# Patient Record
Sex: FEMALE | Race: WHITE | NOT HISPANIC OR LATINO | Employment: OTHER | URBAN - METROPOLITAN AREA
[De-identification: names, ages, dates, MRNs, and addresses within clinical notes are randomized per-mention and may not be internally consistent; named-entity substitution may affect disease eponyms.]

---

## 2017-02-09 ENCOUNTER — OFFICE VISIT (OUTPATIENT)
Dept: DERMATOLOGY | Facility: CLINIC | Age: 67
End: 2017-02-09
Payer: MEDICARE

## 2017-02-09 VITALS — WEIGHT: 187 LBS | HEIGHT: 66 IN | BODY MASS INDEX: 30.05 KG/M2

## 2017-02-09 DIAGNOSIS — L81.4 SOLAR LENTIGO: ICD-10-CM

## 2017-02-09 DIAGNOSIS — D69.2 SENILE PURPURA: ICD-10-CM

## 2017-02-09 DIAGNOSIS — D22.9 MULTIPLE BENIGN NEVI: ICD-10-CM

## 2017-02-09 DIAGNOSIS — L82.1 SEBORRHEIC KERATOSES: Primary | ICD-10-CM

## 2017-02-09 DIAGNOSIS — D18.01 CHERRY ANGIOMA: ICD-10-CM

## 2017-02-09 PROCEDURE — 99203 OFFICE O/P NEW LOW 30 MIN: CPT | Mod: S$GLB,,, | Performed by: DERMATOLOGY

## 2017-02-09 PROCEDURE — 99999 PR PBB SHADOW E&M-EST. PATIENT-LVL II: CPT | Mod: PBBFAC,,, | Performed by: DERMATOLOGY

## 2017-02-09 RX ORDER — CLOTRIMAZOLE AND BETAMETHASONE DIPROPIONATE 10; .64 MG/G; MG/G
CREAM TOPICAL
Refills: 3 | COMMUNITY
Start: 2016-11-08 | End: 2018-12-17 | Stop reason: ALTCHOICE

## 2017-02-09 RX ORDER — MULTIVITAMIN
1 TABLET ORAL DAILY
COMMUNITY

## 2017-02-09 NOTE — PROGRESS NOTES
"  Subjective:       Patient ID:  Lorraine Moreno is a 66 y.o. female who presents for   Chief Complaint   Patient presents with    Skin Check     TBSE    Spot     Left arm    Dry Skin     Lower abdomen     HPI Comments: Initial visit  No h/o skin cancer  Requests TBSE for cancer screening    Reports intolerance to sunscreens, "make me breakout"  H/o bx post neck, "a long time ago" benign per patient, unknown provider        Spot  - Initial  Affected locations: left arm  Duration: 5 years  Signs / symptoms: asymptomatic  Severity: mild  Timing: constant  Aggravated by: nothing  Relieving factors/Treatments tried: nothing    Dry Skin  - Initial  Affected locations: Lower abdomen.  Duration: 4 years  Signs / symptoms: itching and dryness  Severity: mild  Timing: intermittent  Aggravated by: nothing  Relieving factors/Treatments tried: OTC moisturizer  Improvement on treatment: mild        Review of Systems   Constitutional: Negative for fever, chills, weight loss and night sweats.   Skin: Positive for dry skin and wears hat. Negative for daily sunscreen use and activity-related sunscreen use.   Hematologic/Lymphatic: Bruises/bleeds easily.        Objective:    Physical Exam   Constitutional: She appears well-developed and well-nourished. No distress.   Neurological: She is alert and oriented to person, place, and time. She is not disoriented.   Psychiatric: She has a normal mood and affect.   Skin:   Areas Examined (abnormalities noted in diagram):   Scalp / Hair Palpated and Inspected  Head / Face Inspection Performed  Neck Inspection Performed  Chest / Axilla Inspection Performed  Abdomen Inspection Performed  Genitals / Buttocks / Groin Inspection Performed  Back Inspection Performed  RUE Inspected  LUE Inspection Performed  RLE Inspected  LLE Inspection Performed  Nails and Digits Inspection Performed                   Diagram Legend     Erythematous scaling macule/papule c/w actinic keratosis       Vascular papule " c/w angioma      Pigmented verrucoid papule/plaque c/w seborrheic keratosis      Yellow umbilicated papule c/w sebaceous hyperplasia      Irregularly shaped tan macule c/w lentigo     1-2 mm smooth white papules consistent with Milia      Movable subcutaneous cyst with punctum c/w epidermal inclusion cyst      Subcutaneous movable cyst c/w pilar cyst      Firm pink to brown papule c/w dermatofibroma      Pedunculated fleshy papule(s) c/w skin tag(s)      Evenly pigmented macule c/w junctional nevus     Mildly variegated pigmented, slightly irregular-bordered macule c/w mildly atypical nevus      Flesh colored to evenly pigmented papule c/w intradermal nevus       Pink pearly papule/plaque c/w basal cell carcinoma      Erythematous hyperkeratotic cursted plaque c/w SCC      Surgical scar with no sign of skin cancer recurrence      Open and closed comedones      Inflammatory papules and pustules      Verrucoid papule consistent consistent with wart     Erythematous eczematous patches and plaques     Dystrophic onycholytic nail with subungual debris c/w onychomycosis     Umbilicated papule    Erythematous-base heme-crusted tan verrucoid plaque consistent with inflamed seborrheic keratosis     Erythematous Silvery Scaling Plaque c/w Psoriasis     See annotation      Assessment / Plan:        Seborrheic keratoses  These are benign inherited growths without a malignant potential. Reassurance given to patient. No treatment is necessary.     Cherry angioma  This is a benign vascular lesion. Reassurance given. No treatment required.     Solar lentigo  This is a benign hyperpigmented sun induced lesion. Daily sun protection will reduce the number of new lesions. Treatment of these benign lesions are considered cosmetic.    Senile purpura  Common finding in aging sun damaged skin; ASA contributes    Multiple benign nevi  Discussed ABCDE's of nevi.  Monitor for new mole or moles that are becoming bigger, darker, irritated, or  developing irregular borders.     Patient instructed in importance in daily sun protection of at least spf 30. Sun avoidance and topical protection discussed.   Recommend Elta MD (physician office) COTZ sensitive (available online) for daily use on face and neck.  Patient encouraged to wear hat for all outdoor exposure.   Also discussed sun protective clothing.             Return in about 1 year (around 2/9/2018).

## 2017-07-17 ENCOUNTER — DOCUMENTATION ONLY (OUTPATIENT)
Dept: FAMILY MEDICINE | Facility: CLINIC | Age: 67
End: 2017-07-17

## 2017-07-17 DIAGNOSIS — Z11.59 NEED FOR HEPATITIS C SCREENING TEST: Primary | ICD-10-CM

## 2017-07-17 NOTE — PATIENT INSTRUCTIONS
Weight Management: Getting Started  Healthy bodies come in all shapes and sizes. Not all bodies are made to be thin. For some people, a healthy weight is higher than the average weight listed on weight charts. Your healthcare provider can help you decide on a healthy weight for you.    Reasons to lose weight  Losing weight can help with some health problems, such as high blood pressure, heart disease, diabetes, sleep apnea, and arthritis. You may also feel more energy.  Set your long-term goal  Your goal doesn't even have to be a specific weight. You may decide on a fitness goal (such as being able to walk 10 miles a week), or a health goal (such as lowering your blood pressure). Choose a goal that is measurable and reasonable, so you know when you've reached it. A goal of reaching a BMI of less than 25 is not always reasonable (or possible).   Make an action plan  Habits dont change overnight. Setting your goals too high can leave you feeling discouraged if you cant reach them. Be realistic. Choose one or two small changes you can make now. Set an action plan for how you are going to make these changes. When you can stick to this plan, keep making a few more small changes. Taking small steps will help you stay on the path to success.  Track your progress  Write down your goals. Then, keep a daily record of your progress. Write down what you eat and how active you are. This record lets you look back on how much youve done. It may also help when youre feeling frustrated. Reward yourself for success. Even if you dont reach every goal, give yourself credit for what you do get done.  Get support  Encouragement from others can help make losing weight easier. Ask your family members and friends for support. They may even want to join you. Also look to your healthcare provider, registered dietitian, and  for help. Your local hospital can give you more information about nutrition, exercise, and  weight loss.  Date Last Reviewed: 1/31/2016 © 2000-2016 [employer]. [address] [place] [address], [place] [address]. All rights reserved. This information is not intended as a substitute for professional medical care. Always follow your healthcare professional's instructions.        Walking for Fitness  Fitness walking has something for everyone, even people who are already fit. Walking is one of the safest ways to condition your body aerobically. It can boost energy, help you lose weight, and reduce stress.    Physical benefits  · Walking strengthens your heart and lungs, and tones your muscles.  · When walking, your feet land with less impact than in other sports. This reduces chances of muscle, bone, and joint injury.  · Regular walking improves your cholesterol levels and lowers your risk of heart disease. And it helps you control your blood sugar if you have diabetes.  · Walking is a weight-bearing activity, which helps maintain bone density. This can help prevent osteoporosis.  Personal rewards  · Taking walks can help you relax and manage stress. And fitness walking may make you feel better about yourself.  · Walking can help you sleep better at night and make you less likely to be depressed.  · Regular walking may help maintain your memory as you get older.  · Walking is a great way to spend extra time with friends and family members. Be sure to invite your dog along!  Q&A about fitness walking  Q: Will walking keep me fit?  A: Yes. Regular walking at the right pace gives you all the benefits of other aerobic activities, such as jogging and swimming.  Q: Will walking help me lose weight and keep it off?  A: Yes. Per mile, walking can burn as many calories as jogging. Your health care provider can help work walking into your weight-loss plan.  Q: Is walking safe for my health?  A: Yes. Walking is safe if you have high blood pressure, diabetes, heart disease, or other conditions. Talk to your health  care provider before you start.  Date Last Reviewed: 5/9/2015  © 5720-3547 [employer]. [address] [place] [address], [place] [address]. All rights reserved. This information is not intended as a substitute for professional medical care. Always follow your healthcare professional's instructions.

## 2017-07-17 NOTE — PROGRESS NOTES
Pre-Visit Chart Review  For Appointment Scheduled on 07/20/2017    Health Maintenance Due   Topic Date Due    Hepatitis C Screening  1950    Lipid Panel  1950    TETANUS VACCINE  02/25/1969    Colonoscopy  02/25/2001    Zoster Vaccine  02/25/2011    Pneumococcal (65+) (1 of 2 - PCV13) 02/25/2016    DEXA SCAN  07/01/2016

## 2017-07-20 ENCOUNTER — OFFICE VISIT (OUTPATIENT)
Dept: FAMILY MEDICINE | Facility: CLINIC | Age: 67
End: 2017-07-20
Payer: MEDICARE

## 2017-07-20 VITALS
WEIGHT: 187.38 LBS | DIASTOLIC BLOOD PRESSURE: 59 MMHG | BODY MASS INDEX: 30.11 KG/M2 | HEIGHT: 66 IN | TEMPERATURE: 99 F | SYSTOLIC BLOOD PRESSURE: 98 MMHG | HEART RATE: 70 BPM

## 2017-07-20 DIAGNOSIS — Z86.010 HISTORY OF COLON POLYPS: ICD-10-CM

## 2017-07-20 DIAGNOSIS — M19.90 OSTEOARTHRITIS, UNSPECIFIED OSTEOARTHRITIS TYPE, UNSPECIFIED SITE: ICD-10-CM

## 2017-07-20 DIAGNOSIS — Z00.00 ENCOUNTER FOR PREVENTIVE HEALTH EXAMINATION: Primary | ICD-10-CM

## 2017-07-20 DIAGNOSIS — E66.9 OBESITY (BMI 30.0-34.9): ICD-10-CM

## 2017-07-20 DIAGNOSIS — E78.5 HYPERLIPIDEMIA, UNSPECIFIED HYPERLIPIDEMIA TYPE: ICD-10-CM

## 2017-07-20 DIAGNOSIS — N32.81 OVERACTIVE BLADDER: ICD-10-CM

## 2017-07-20 DIAGNOSIS — I10 ESSENTIAL HYPERTENSION: ICD-10-CM

## 2017-07-20 PROBLEM — Z86.0100 HISTORY OF COLON POLYPS: Status: ACTIVE | Noted: 2017-07-20

## 2017-07-20 PROBLEM — E66.811 OBESITY (BMI 30.0-34.9): Status: ACTIVE | Noted: 2017-07-20

## 2017-07-20 PROCEDURE — 99999 PR PBB SHADOW E&M-EST. PATIENT-LVL IV: CPT | Mod: PBBFAC,,, | Performed by: PHYSICIAN ASSISTANT

## 2017-07-20 PROCEDURE — G0439 PPPS, SUBSEQ VISIT: HCPCS | Mod: S$GLB,,, | Performed by: PHYSICIAN ASSISTANT

## 2017-07-20 RX ORDER — FERROUS SULFATE 325(65) MG
325 TABLET ORAL NIGHTLY PRN
COMMUNITY
End: 2021-05-14

## 2017-07-20 RX ORDER — FLAXSEED OIL 1000 MG
1000 CAPSULE ORAL DAILY
COMMUNITY
End: 2017-10-05

## 2017-07-20 NOTE — PATIENT INSTRUCTIONS
Counseling and Referral of Other Preventative  (Italic type indicates deductible and co-insurance are waived)    Patient Name: Lorraine Moreno  Today's Date: 7/20/2017      SERVICE LIMITATIONS RECOMMENDATION    Vaccines    · Pneumococcal (once after 65)    · Influenza (annually)    · Hepatitis B (if medium/high risk)    · Prevnar 13      Hepatitis B medium/high risk factors:       - End-stage renal disease       - Hemophiliacs who received Factor VII or         IX concentrates       - Clients of institutions for the mentally             retarded       - Persons who live in the same house as          a HepB carrier       - Homosexual men       - Illicit injectable drug abusers     Pneumococcal: Recommend f/u with PCP     Influenza: Done, repeat in one year     Hepatitis B: N/A     Prevnar 13: Recommend f/u with PCP    Mammogram (biennial age 50-74)  Annually (age 40 or over)  Done this year, repeat every year    Pap (up to age 70 and after 70 if unknown history or abnormal study last 10 years)   Per GYN rec every other year         Colorectal cancer screening (to age 75)    · Fecal occult blood test (annual)  · Flexible sigmoidoscopy (5y)  · Screening colonoscopy (10y)  · Barium enema   Last done 2014, recommend to repeat every 10  years    Diabetes self-management training (no USPSTF recommendations)  Requires referral by treating physician for patient with diabetes or renal disease. 10 hours of initial DSMT sessions of no less than 30 minutes each in a continuous 12-month period. 2 hours of follow-up DSMT in subsequent years.  N/A    Bone mass measurements (age 65 & older, biennial)  Requires diagnosis related to osteoporosis or estrogen deficiency. Biennial benefit unless patient has history of long-term glucocorticoid  Last done 2013, recommend to repeat every 5  years    Glaucoma screening (no USPSTF recommendation)  Diabetes mellitus, family history   , age 50 or over    American, age 65 or  over Annually    Medical nutrition therapy for diabetes or renal disease (no recommended schedule)  Requires referral by treating physician for patient with diabetes or renal disease or kidney transplant within the past 3 years.  Can be provided in same year as diabetes self-management training (DSMT), and CMS recommends medical nutrition therapy take place after DSMT. Up to 3 hours for initial year and 2 hours in subsequent years. N/A    Cardiovascular screening blood tests (every 5 years)  · Fasting lipid panel  Order as a panel if possible  Done this year, repeat every year    Diabetes screening tests (at least every 3 years, Medicare covers annually or at 6-month intervals for prediabetic patients)  · Fasting blood sugar (FBS) or glucose tolerance test (GTT)  Patient must be diagnosed with one of the following:       - Hypertension       - Dyslipidemia       - Obesity (BMI 30kg/m2)       - Previous elevated impaired FBS or GTT       ... or any two of the following:       - Overweight (BMI 25 but <30)       - Family history of diabetes       - Age 65 or older       - History of gestational diabetes or birth of baby weighing more than 9 pounds  Done this year, repeat every year    Abdominal aortic aneurysm screening (once)  · Sonogram   Limited to patients who meet one of the following criteria:       - Men who are 65-75 years old and have smoked more than 100 cigarette in their lifetime       - Anyone with a family history of abdominal aortic aneurysm       - Anyone recommended for screening by the USPSTF  N/A    HIV screening (annually for increased risk patients)  · HIV-1 and HIV-2 by EIA, or JIMI, rapid antibody test or oral mucosa transudate  Patients must be at increased risk for HIV infection per USPSTF guidelines or pregnant. Tests covered annually for patient at increased risk or as requested by the patient. Pregnant patients may receive up to 3 tests during pregnancy.  Risks discussed, screening is not  recommended    Smoking cessation counseling (up to 8 sessions per year)  Patients must be asymptomatic of tobacco-related conditions to receive as a preventative service.  Non-smoker    Subsequent annual wellness visit  At least 12 months since last AWV  Return in one year     The following information is provided to all patients.  This information is to help you find resources for any of the problems found today that may be affecting your health:                Living healthy guide: [email or web address].[email or web address].[facility]      Understanding Diabetes: www.diabetes.org      Eating healthy: www.cdc.gov/healthyweight      [facility] home safety checklist: www.cdc.gov/steadi/patient.html      Agency on Aging: www.goea.[place].[facility]      Alcoholics anonymous (AA): www.aa.org      Physical Activity: www.benjamin.nih.gov/un7atcb      Tobacco use: www.quitwithusla.org

## 2017-07-20 NOTE — PROGRESS NOTES
Subjective:       Patient ID: Lorraine Moreno is here for   Chief Complaint   Patient presents with    Health Risk Assessment       Lorraine Moreno was seen today in a face to face encounter for a comprehensive Health Risk Assessment. This visit included a review of her total medical record available at the time of this visit.        Past Medical, Family, and Surgical History:      This information was reviewed and reconciled today during this encounter. Medications were reviewed and reconciled today. The active problem list has been reviewed/updated and reconciled today. These active problems are listed in the diagnosis list below.    **See completed HRA forms/Questionnaires/Flowsheets for ROS information.    Physical Exam   Constitutional: She is oriented to person, place, and time. She appears well-developed.   Obese body habitus.   HENT:   Head: Normocephalic and atraumatic.   Right Ear: Hearing and external ear normal.   Left Ear: Hearing and external ear normal.   Eyes: Conjunctivae and EOM are normal. Pupils are equal, round, and reactive to light.   Cardiovascular: Normal rate, regular rhythm, normal heart sounds and intact distal pulses.    Pulmonary/Chest: Effort normal and breath sounds normal.   Neurological: She is alert and oriented to person, place, and time.   Skin: Skin is warm and dry.   Psychiatric: She has a normal mood and affect. Her behavior is normal.   Vitals reviewed.        Diagnoses (identified today) and assoicated plan for each diagnosis:         Encounter for preventive health examination    Essential hypertension  Comments:  Controlled, continue tenormin and norvasc as prescribed by PCP    Hyperlipidemia, unspecified hyperlipidemia type  Comments:  Stable, on pravastatin 20 mg daily, continue to follow with PCP    Osteoarthritis, unspecified osteoarthritis type, unspecified site  Comments:  Chronic, on norco 10/325 mg PRN per PCP    Overactive bladder  Comments:  Stable, on oxybutynin per  PCP    History of colon polyps  Comments:  Per pt, last colonoscopy in 2014 recommended 10 year f/u with Dr. Aly    Obesity (BMI 30.0-34.9)  Comments:  Uncontrolled, encouraged weight loss and increasing physical activity    With regards to health maintenance, pt states she is up to date with lab testing such as lipid panel. Recommended pt obtain zoster vaccine and tetanus vaccine at local pharmacy approved by her insurance. Pt recommended to f/u with PCP for pneumo 23 and prevnar 13 if not already completed. Recommended 1 one screening for Hep C with PCP.     Patient readiness: acceptance and barriers:none    During the course of the visit the patient was educated and counseled about the following:     Obesity:   Regular aerobic exercise program discussed.    Goals: Obesity: Reduce calorie intake and BMI    Did patient meet goals/outcomes: Yes    The following self management tools provided: declined    Patient Instructions (the written plan) was given to the patient/family.     Time spent with patient: 30 minutes

## 2017-10-05 ENCOUNTER — OFFICE VISIT (OUTPATIENT)
Dept: DERMATOLOGY | Facility: CLINIC | Age: 67
End: 2017-10-05
Payer: MEDICARE

## 2017-10-05 VITALS — HEIGHT: 66 IN | BODY MASS INDEX: 30.05 KG/M2 | WEIGHT: 187 LBS

## 2017-10-05 DIAGNOSIS — L82.0 INFLAMED SEBORRHEIC KERATOSIS: ICD-10-CM

## 2017-10-05 DIAGNOSIS — L71.0 PERIORAL DERMATITIS: Primary | ICD-10-CM

## 2017-10-05 PROCEDURE — 99999 PR PBB SHADOW E&M-EST. PATIENT-LVL II: CPT | Mod: PBBFAC,,, | Performed by: DERMATOLOGY

## 2017-10-05 PROCEDURE — 17110 DESTRUCTION B9 LES UP TO 14: CPT | Mod: S$GLB,,, | Performed by: DERMATOLOGY

## 2017-10-05 PROCEDURE — 99213 OFFICE O/P EST LOW 20 MIN: CPT | Mod: 25,S$GLB,, | Performed by: DERMATOLOGY

## 2017-10-05 RX ORDER — DOXYCYCLINE 100 MG/1
CAPSULE ORAL
Qty: 30 CAPSULE | Refills: 0 | Status: SHIPPED | OUTPATIENT
Start: 2017-10-05 | End: 2018-11-29 | Stop reason: CLARIF

## 2017-10-05 NOTE — PROGRESS NOTES
"  Subjective:       Patient ID:  Lorraine Moreno is a 67 y.o. female who presents for   Chief Complaint   Patient presents with    Spot     face, scalp     Patient last seen 2/2017  No h/o skin cancer  Stye in July,   C/o "blister like bumps" on R cheek, started at the time of the stye, now "breaking out all the time", mostly R cheek and nose  continuously new ones  washes face with olay  Cleanser x long time  elta MD daily moisturizer with sunscreen (only uses at the beach)  olay moisturizer night cream, no day cream  Not itchy          Spot  - Initial  Affected locations: face and scalp  Duration: 8 months  Signs / symptoms: dryness and scaling  Severity: mild  Timing: constant  Aggravated by: nothing  Treatments tried: Oil of Olay night cream.  Improvement on treatment: no relief        Review of Systems   Constitutional: Negative for fever, chills, weight loss, weight gain, fatigue, night sweats and malaise.   Skin: Positive for dry skin (face), activity-related sunscreen use and wears hat. Negative for daily sunscreen use.   Hematologic/Lymphatic: Bruises/bleeds easily.        Objective:    Physical Exam   Skin:   Areas Examined (abnormalities noted in diagram):   Scalp / Hair Palpated and Inspected  Head / Face Inspection Performed              Diagram Legend     Erythematous scaling macule/papule c/w actinic keratosis       Vascular papule c/w angioma      Pigmented verrucoid papule/plaque c/w seborrheic keratosis      Yellow umbilicated papule c/w sebaceous hyperplasia      Irregularly shaped tan macule c/w lentigo     1-2 mm smooth white papules consistent with Milia      Movable subcutaneous cyst with punctum c/w epidermal inclusion cyst      Subcutaneous movable cyst c/w pilar cyst      Firm pink to brown papule c/w dermatofibroma      Pedunculated fleshy papule(s) c/w skin tag(s)      Evenly pigmented macule c/w junctional nevus     Mildly variegated pigmented, slightly irregular-bordered macule c/w mildly " atypical nevus      Flesh colored to evenly pigmented papule c/w intradermal nevus       Pink pearly papule/plaque c/w basal cell carcinoma      Erythematous hyperkeratotic cursted plaque c/w SCC      Surgical scar with no sign of skin cancer recurrence      Open and closed comedones      Inflammatory papules and pustules      Verrucoid papule consistent consistent with wart     Erythematous eczematous patches and plaques     Dystrophic onycholytic nail with subungual debris c/w onychomycosis     Umbilicated papule    Erythematous-base heme-crusted tan verrucoid plaque consistent with inflamed seborrheic keratosis     Erythematous Silvery Scaling Plaque c/w Psoriasis     See annotation      Assessment / Plan:        Perioral dermatitis  -     doxycycline (VIBRAMYCIN) 100 MG Cap; One tab PO daily with food and tall glass of water  Dispense: 30 capsule; Refill: 0  No blistering component appreciated today  Distribution not consistent with herpes or VZV    Inflamed seborrheic keratosis, itchy, bothersome, inflamed by comb  Cryosurgery procedure note:    Verbal consent from the patient is obtained. Liquid nitrogen cryosurgery is applied to 1 lesions to produce a freeze injury. The patient is aware that blisters may form and is instructed on wound care with gentle cleansing and use of vaseline ointment to keep moist until healed. The patient is supplied a handout on cryosurgery and is instructed to call if lesions do not completely resolve.             Return if symptoms worsen or fail to improve.

## 2018-04-18 DIAGNOSIS — Z12.31 VISIT FOR SCREENING MAMMOGRAM: Primary | ICD-10-CM

## 2018-04-26 ENCOUNTER — HOSPITAL ENCOUNTER (OUTPATIENT)
Dept: RADIOLOGY | Facility: HOSPITAL | Age: 68
Discharge: HOME | End: 2018-04-26
Attending: SPECIALIST
Payer: MEDICARE

## 2018-04-26 DIAGNOSIS — Z12.31 VISIT FOR SCREENING MAMMOGRAM: ICD-10-CM

## 2018-04-26 PROCEDURE — 77067 SCR MAMMO BI INCL CAD: CPT | Mod: 26,,, | Performed by: RADIOLOGY

## 2018-04-26 PROCEDURE — 77063 BREAST TOMOSYNTHESIS BI: CPT | Mod: 26,,, | Performed by: RADIOLOGY

## 2018-04-26 PROCEDURE — 77067 SCR MAMMO BI INCL CAD: CPT | Mod: TC

## 2018-12-12 PROBLEM — M17.11 PRIMARY OSTEOARTHRITIS OF RIGHT KNEE: Status: ACTIVE | Noted: 2018-12-12

## 2019-07-18 DIAGNOSIS — Z12.39 BREAST SCREENING: Primary | ICD-10-CM

## 2019-07-22 ENCOUNTER — HOSPITAL ENCOUNTER (OUTPATIENT)
Dept: RADIOLOGY | Facility: HOSPITAL | Age: 69
Discharge: HOME | End: 2019-07-22
Attending: SPECIALIST
Payer: MEDICARE

## 2019-07-22 DIAGNOSIS — Z12.39 BREAST SCREENING: ICD-10-CM

## 2019-07-22 PROCEDURE — 77063 MAMMO DIGITAL SCREENING BILAT WITH TOMOSYNTHESIS_CAD: ICD-10-PCS | Mod: 26,,, | Performed by: RADIOLOGY

## 2019-07-22 PROCEDURE — 77067 SCR MAMMO BI INCL CAD: CPT | Mod: TC

## 2019-07-22 PROCEDURE — 77063 BREAST TOMOSYNTHESIS BI: CPT | Mod: 26,,, | Performed by: RADIOLOGY

## 2019-07-22 PROCEDURE — 77067 SCR MAMMO BI INCL CAD: CPT | Mod: 26,,, | Performed by: RADIOLOGY

## 2019-07-22 PROCEDURE — 77067 MAMMO DIGITAL SCREENING BILAT WITH TOMOSYNTHESIS_CAD: ICD-10-PCS | Mod: 26,,, | Performed by: RADIOLOGY

## 2019-08-29 ENCOUNTER — OFFICE VISIT (OUTPATIENT)
Dept: DERMATOLOGY | Facility: CLINIC | Age: 69
End: 2019-08-29
Payer: MEDICARE

## 2019-08-29 VITALS — BODY MASS INDEX: 26.53 KG/M2 | WEIGHT: 169 LBS | HEIGHT: 67 IN

## 2019-08-29 DIAGNOSIS — Z12.83 SKIN CANCER SCREENING: ICD-10-CM

## 2019-08-29 DIAGNOSIS — L81.4 SOLAR LENTIGO: ICD-10-CM

## 2019-08-29 DIAGNOSIS — D22.9 MULTIPLE BENIGN NEVI: ICD-10-CM

## 2019-08-29 DIAGNOSIS — D18.01 CHERRY ANGIOMA: ICD-10-CM

## 2019-08-29 DIAGNOSIS — L82.1 SEBORRHEIC KERATOSES: Primary | ICD-10-CM

## 2019-08-29 PROCEDURE — 1101F PT FALLS ASSESS-DOCD LE1/YR: CPT | Mod: CPTII,S$GLB,, | Performed by: DERMATOLOGY

## 2019-08-29 PROCEDURE — 99999 PR PBB SHADOW E&M-EST. PATIENT-LVL II: CPT | Mod: PBBFAC,,, | Performed by: DERMATOLOGY

## 2019-08-29 PROCEDURE — 99213 OFFICE O/P EST LOW 20 MIN: CPT | Mod: S$GLB,,, | Performed by: DERMATOLOGY

## 2019-08-29 PROCEDURE — 99999 PR PBB SHADOW E&M-EST. PATIENT-LVL II: ICD-10-PCS | Mod: PBBFAC,,, | Performed by: DERMATOLOGY

## 2019-08-29 PROCEDURE — 1101F PR PT FALLS ASSESS DOC 0-1 FALLS W/OUT INJ PAST YR: ICD-10-PCS | Mod: CPTII,S$GLB,, | Performed by: DERMATOLOGY

## 2019-08-29 PROCEDURE — 99213 PR OFFICE/OUTPT VISIT, EST, LEVL III, 20-29 MIN: ICD-10-PCS | Mod: S$GLB,,, | Performed by: DERMATOLOGY

## 2019-08-29 NOTE — PROGRESS NOTES
Subjective:       Patient ID:  Lorraine Moreno is a 69 y.o. female who presents for   Chief Complaint   Patient presents with    Skin Check     TBSE    Spot     R forearm, X1 month, worsened by sun, no tx     Patient last seen 10/2017 with perioral derm, cleared with doxy  No h/o skin cancer  H/o AKs treated with cryo    elta MD daily moisturizer with sunscreen ([place] uses at the beach)  olay moisturizer night cream, daily moisturizer from  [facility]          Spot  - Initial  Affected locations: R forearm.  Duration: 1 month  Signs / symptoms: redness  Severity: mild  Timing: constant  Aggravated by: sunlight  Relieving factors/Treatments tried: nothing  Improvement on treatment: no relief        Review of Systems   Constitutional: Negative for fever, chills and fatigue.   Skin: Positive for daily sunscreen use, activity-related sunscreen use and wears hat.   Hematologic/Lymphatic: Bruises/bleeds easily.        Objective:    Physical Exam   Constitutional: She appears well-developed and well-nourished. No distress.   Neurological: She is alert and oriented to person, place, and time. She is not disoriented.   Psychiatric: She has a normal mood and affect.   Skin:   Areas Examined (abnormalities noted in diagram):   Scalp / Hair Palpated and Inspected  Head / Face Inspection Performed  Neck Inspection Performed  Chest / Axilla Inspection Performed  Abdomen Inspection Performed  Genitals / Buttocks / Groin Inspection Performed  Back Inspection Performed  RUE Inspected  LUE Inspection Performed  RLE Inspected  LLE Inspection Performed  Nails and Digits Inspection Performed                       Diagram Legend     Erythematous scaling macule/papule c/w actinic keratosis       Vascular papule c/w angioma      Pigmented verrucoid papule/plaque c/w seborrheic keratosis      Yellow umbilicated papule c/w sebaceous hyperplasia      Irregularly shaped tan macule c/w lentigo     1-2 mm smooth white papules consistent  with Milia      Movable subcutaneous cyst with punctum c/w epidermal inclusion cyst      Subcutaneous movable cyst c/w pilar cyst      Firm pink to brown papule c/w dermatofibroma      Pedunculated fleshy papule(s) c/w skin tag(s)      Evenly pigmented macule c/w junctional nevus     Mildly variegated pigmented, slightly irregular-bordered macule c/w mildly atypical nevus      Flesh colored to evenly pigmented papule c/w intradermal nevus       Pink pearly papule/plaque c/w basal cell carcinoma      Erythematous hyperkeratotic cursted plaque c/w SCC      Surgical scar with no sign of skin cancer recurrence      Open and closed comedones      Inflammatory papules and pustules      Verrucoid papule consistent consistent with wart     Erythematous eczematous patches and plaques     Dystrophic onycholytic nail with subungual debris c/w onychomycosis     Umbilicated papule    Erythematous-base heme-crusted tan verrucoid plaque consistent with inflamed seborrheic keratosis     Erythematous Silvery Scaling Plaque c/w Psoriasis     See annotation      Assessment / Plan:        Seborrheic keratoses  These are benign inherited growths without a malignant potential. Reassurance given to patient. No treatment is necessary.     Solar lentigo  This is a benign hyperpigmented sun induced lesion. Daily sun protection will reduce the number of new lesions. Treatment of these benign lesions are considered cosmetic.    Cherry angioma  This is a benign vascular lesion. Reassurance given. No treatment required.     Multiple benign nevi  Monitor for new mole or moles that are becoming bigger, darker, irritated, or developing irregular borders.    Skin cancer screening  Total body skin examination performed today including at least 12 points as noted in physical examination. No lesions suspicious for malignancy noted.    Patient instructed in importance in daily sun protection of at least spf 30. Mineral sunscreen ingredients preferred  (Zinc +/- Titanium).   Recommend Elta MD for daily use on face and neck.  Patient encouraged to wear hat for all outdoor exposure.   Also discussed sun avoidance and use of protective clothing.         Follow up in about 1 year (around 8/29/2020), or if symptoms worsen or fail to improve.

## 2020-08-26 ENCOUNTER — HOSPITAL ENCOUNTER (OUTPATIENT)
Dept: RADIOLOGY | Facility: CLINIC | Age: 70
Discharge: HOME | End: 2020-08-26
Attending: SPECIALIST
Payer: MEDICARE

## 2020-08-26 DIAGNOSIS — Z13.820 SPECIAL SCREENING FOR OSTEOPOROSIS: ICD-10-CM

## 2020-08-26 DIAGNOSIS — Z78.0 MENOPAUSE: ICD-10-CM

## 2020-08-26 DIAGNOSIS — Z12.31 VISIT FOR SCREENING MAMMOGRAM: ICD-10-CM

## 2020-08-26 PROCEDURE — 77067 SCR MAMMO BI INCL CAD: CPT | Mod: 26,,, | Performed by: RADIOLOGY

## 2020-08-26 PROCEDURE — 77080 DXA BONE DENSITY AXIAL: CPT | Mod: 26,,, | Performed by: RADIOLOGY

## 2020-08-26 PROCEDURE — 77080 DXA BONE DENSITY AXIAL: CPT | Mod: TC,PO

## 2020-08-26 PROCEDURE — 77067 MAMMO DIGITAL SCREENING BILAT WITH TOMOSYNTHESIS_CAD: ICD-10-PCS | Mod: 26,,, | Performed by: RADIOLOGY

## 2020-08-26 PROCEDURE — 77063 BREAST TOMOSYNTHESIS BI: CPT | Mod: 26,,, | Performed by: RADIOLOGY

## 2020-08-26 PROCEDURE — 77080 DEXA BONE DENSITY SPINE HIP: ICD-10-PCS | Mod: 26,,, | Performed by: RADIOLOGY

## 2020-08-26 PROCEDURE — 77067 SCR MAMMO BI INCL CAD: CPT | Mod: TC,PO

## 2020-08-26 PROCEDURE — 77063 MAMMO DIGITAL SCREENING BILAT WITH TOMOSYNTHESIS_CAD: ICD-10-PCS | Mod: 26,,, | Performed by: RADIOLOGY

## 2020-09-08 ENCOUNTER — HOSPITAL ENCOUNTER (OUTPATIENT)
Dept: RADIOLOGY | Facility: HOSPITAL | Age: 70
Discharge: HOME | End: 2020-09-08
Attending: SPECIALIST
Payer: MEDICARE

## 2020-09-08 DIAGNOSIS — N63.15 BREAST LUMP ON RIGHT SIDE AT 3 O'CLOCK POSITION: ICD-10-CM

## 2020-09-08 PROCEDURE — 77065 DX MAMMO INCL CAD UNI: CPT | Mod: 26,RT,, | Performed by: RADIOLOGY

## 2020-09-08 PROCEDURE — 77061 MAMMO DIGITAL DIAGNOSTIC RIGHT WITH TOMOSYNTHESIS_CAD: ICD-10-PCS | Mod: 26,RT,, | Performed by: RADIOLOGY

## 2020-09-08 PROCEDURE — 77065 DX MAMMO INCL CAD UNI: CPT | Mod: TC

## 2020-09-08 PROCEDURE — 77061 BREAST TOMOSYNTHESIS UNI: CPT | Mod: TC

## 2020-09-08 PROCEDURE — 77061 BREAST TOMOSYNTHESIS UNI: CPT | Mod: 26,RT,, | Performed by: RADIOLOGY

## 2020-09-08 PROCEDURE — 77065 MAMMO DIGITAL DIAGNOSTIC RIGHT WITH TOMOSYNTHESIS_CAD: ICD-10-PCS | Mod: 26,RT,, | Performed by: RADIOLOGY

## 2021-01-19 RX ORDER — AMLODIPINE BESYLATE 5 MG/1
5 TABLET ORAL DAILY
Qty: 90 TABLET | Refills: 3 | Status: SHIPPED | OUTPATIENT
Start: 2021-01-19 | End: 2021-01-19 | Stop reason: SDUPTHER

## 2021-01-19 RX ORDER — PRAVASTATIN SODIUM 20 MG/1
20 TABLET ORAL NIGHTLY
Qty: 90 TABLET | Refills: 3 | Status: SHIPPED | OUTPATIENT
Start: 2021-01-19 | End: 2021-04-15 | Stop reason: SDUPTHER

## 2021-01-19 RX ORDER — AMLODIPINE BESYLATE 5 MG/1
5 TABLET ORAL DAILY
Qty: 90 TABLET | Refills: 3 | Status: SHIPPED | OUTPATIENT
Start: 2021-01-19 | End: 2021-04-15 | Stop reason: SDUPTHER

## 2021-04-14 LAB
ALBUMIN SERPL-MCNC: 4 G/DL (ref 3.6–5.1)
ALBUMIN/GLOB SERPL: 1.7 (CALC) (ref 1–2.5)
ALP SERPL-CCNC: 67 U/L (ref 37–153)
ALT SERPL-CCNC: 8 U/L (ref 6–29)
APPEARANCE UR: CLEAR
AST SERPL-CCNC: 15 U/L (ref 10–35)
BILIRUB SERPL-MCNC: 0.6 MG/DL (ref 0.2–1.2)
BILIRUB UR QL STRIP: NEGATIVE
BUN SERPL-MCNC: 19 MG/DL (ref 7–25)
BUN/CREAT SERPL: NORMAL (CALC) (ref 6–22)
CALCIUM SERPL-MCNC: 9.3 MG/DL (ref 8.6–10.4)
CHLORIDE SERPL-SCNC: 108 MMOL/L (ref 98–110)
CHOLEST SERPL-MCNC: 195 MG/DL
CHOLEST/HDLC SERPL: 2.2 (CALC)
CO2 SERPL-SCNC: 28 MMOL/L (ref 20–32)
COLOR UR: YELLOW
CREAT SERPL-MCNC: 0.77 MG/DL (ref 0.6–0.93)
ERYTHROCYTE [DISTWIDTH] IN BLOOD BY AUTOMATED COUNT: 12.7 % (ref 11–15)
GLOBULIN SER CALC-MCNC: 2.3 G/DL (CALC) (ref 1.9–3.7)
GLUCOSE SERPL-MCNC: 92 MG/DL (ref 65–99)
GLUCOSE UR QL STRIP: NEGATIVE
HBA1C MFR BLD: 5 % OF TOTAL HGB
HCT VFR BLD AUTO: 36 % (ref 35–45)
HDLC SERPL-MCNC: 87 MG/DL
HGB BLD-MCNC: 11.7 G/DL (ref 11.7–15.5)
HGB UR QL STRIP: NEGATIVE
KETONES UR QL STRIP: NEGATIVE
LDLC SERPL CALC-MCNC: 91 MG/DL (CALC)
LEUKOCYTE ESTERASE UR QL STRIP: NEGATIVE
MAGNESIUM SERPL-MCNC: 2.1 MG/DL (ref 1.5–2.5)
MCH RBC QN AUTO: 29.8 PG (ref 27–33)
MCHC RBC AUTO-ENTMCNC: 32.5 G/DL (ref 32–36)
MCV RBC AUTO: 91.6 FL (ref 80–100)
NITRITE UR QL STRIP: NEGATIVE
NONHDLC SERPL-MCNC: 108 MG/DL (CALC)
PH UR STRIP: 7.5 [PH] (ref 5–8)
PLATELET # BLD AUTO: 269 THOUSAND/UL (ref 140–400)
PMV BLD REES-ECKER: 10.3 FL (ref 7.5–12.5)
POTASSIUM SERPL-SCNC: 4.4 MMOL/L (ref 3.5–5.3)
PROT SERPL-MCNC: 6.3 G/DL (ref 6.1–8.1)
PROT UR QL STRIP: NEGATIVE
RBC # BLD AUTO: 3.93 MILLION/UL (ref 3.8–5.1)
SODIUM SERPL-SCNC: 141 MMOL/L (ref 135–146)
SP GR UR STRIP: 1.01 (ref 1–1.03)
TRIGL SERPL-MCNC: 79 MG/DL
WBC # BLD AUTO: 3.8 THOUSAND/UL (ref 3.8–10.8)

## 2021-04-15 RX ORDER — PRAVASTATIN SODIUM 20 MG/1
20 TABLET ORAL NIGHTLY
Qty: 90 TABLET | Refills: 3 | Status: SHIPPED | OUTPATIENT
Start: 2021-04-15 | End: 2021-08-18 | Stop reason: SDUPTHER

## 2021-04-15 RX ORDER — AMLODIPINE BESYLATE 5 MG/1
5 TABLET ORAL DAILY
Qty: 90 TABLET | Refills: 3 | Status: SHIPPED | OUTPATIENT
Start: 2021-04-15 | End: 2021-08-18 | Stop reason: SDUPTHER

## 2021-05-14 ENCOUNTER — OFFICE VISIT (OUTPATIENT)
Dept: DERMATOLOGY | Facility: CLINIC | Age: 71
End: 2021-05-14
Payer: MEDICARE

## 2021-05-14 DIAGNOSIS — L81.4 SOLAR LENTIGO: ICD-10-CM

## 2021-05-14 DIAGNOSIS — D22.9 MULTIPLE BENIGN NEVI: ICD-10-CM

## 2021-05-14 DIAGNOSIS — L91.8 SKIN TAG: ICD-10-CM

## 2021-05-14 DIAGNOSIS — L82.1 SEBORRHEIC KERATOSES: Primary | ICD-10-CM

## 2021-05-14 DIAGNOSIS — D18.01 CHERRY ANGIOMA: ICD-10-CM

## 2021-05-14 PROCEDURE — 99999 PR PBB SHADOW E&M-EST. PATIENT-LVL III: CPT | Mod: PBBFAC,,, | Performed by: DERMATOLOGY

## 2021-05-14 PROCEDURE — 1101F PR PT FALLS ASSESS DOC 0-1 FALLS W/OUT INJ PAST YR: ICD-10-PCS | Mod: S$GLB,,, | Performed by: DERMATOLOGY

## 2021-05-14 PROCEDURE — 99999 PR PBB SHADOW E&M-EST. PATIENT-LVL III: ICD-10-PCS | Mod: PBBFAC,,, | Performed by: DERMATOLOGY

## 2021-05-14 PROCEDURE — 1159F MED LIST DOCD IN RCRD: CPT | Mod: S$GLB,,, | Performed by: DERMATOLOGY

## 2021-05-14 PROCEDURE — 99213 OFFICE O/P EST LOW 20 MIN: CPT | Mod: S$GLB,,, | Performed by: DERMATOLOGY

## 2021-05-14 PROCEDURE — 1126F AMNT PAIN NOTED NONE PRSNT: CPT | Mod: S$GLB,,, | Performed by: DERMATOLOGY

## 2021-05-14 PROCEDURE — 3288F FALL RISK ASSESSMENT DOCD: CPT | Mod: S$GLB,,, | Performed by: DERMATOLOGY

## 2021-05-14 PROCEDURE — 1101F PT FALLS ASSESS-DOCD LE1/YR: CPT | Mod: S$GLB,,, | Performed by: DERMATOLOGY

## 2021-05-14 PROCEDURE — 1159F PR MEDICATION LIST DOCUMENTED IN MEDICAL RECORD: ICD-10-PCS | Mod: S$GLB,,, | Performed by: DERMATOLOGY

## 2021-05-14 PROCEDURE — 3288F PR FALLS RISK ASSESSMENT DOCUMENTED: ICD-10-PCS | Mod: S$GLB,,, | Performed by: DERMATOLOGY

## 2021-05-14 PROCEDURE — 99213 PR OFFICE/OUTPT VISIT, EST, LEVL III, 20-29 MIN: ICD-10-PCS | Mod: S$GLB,,, | Performed by: DERMATOLOGY

## 2021-05-14 PROCEDURE — 1126F PR PAIN SEVERITY QUANTIFIED, NO PAIN PRESENT: ICD-10-PCS | Mod: S$GLB,,, | Performed by: DERMATOLOGY

## 2021-08-06 DIAGNOSIS — Z12.31 OTHER SCREENING MAMMOGRAM: Primary | ICD-10-CM

## 2021-08-12 ENCOUNTER — OFFICE VISIT (OUTPATIENT)
Dept: CARDIOLOGY | Facility: CLINIC | Age: 71
End: 2021-08-12
Payer: MEDICARE

## 2021-08-12 VITALS
HEIGHT: 67 IN | HEART RATE: 72 BPM | RESPIRATION RATE: 16 BRPM | SYSTOLIC BLOOD PRESSURE: 132 MMHG | DIASTOLIC BLOOD PRESSURE: 80 MMHG | WEIGHT: 172 LBS | BODY MASS INDEX: 27 KG/M2

## 2021-08-12 DIAGNOSIS — M17.11 PRIMARY OSTEOARTHRITIS OF RIGHT KNEE: ICD-10-CM

## 2021-08-12 DIAGNOSIS — E66.9 OBESITY (BMI 30.0-34.9): ICD-10-CM

## 2021-08-12 DIAGNOSIS — E78.2 MIXED HYPERLIPIDEMIA: ICD-10-CM

## 2021-08-12 DIAGNOSIS — I10 ESSENTIAL HYPERTENSION: Primary | ICD-10-CM

## 2021-08-12 PROCEDURE — 3075F PR MOST RECENT SYSTOLIC BLOOD PRESS GE 130-139MM HG: ICD-10-PCS | Mod: S$GLB,,, | Performed by: INTERNAL MEDICINE

## 2021-08-12 PROCEDURE — 3288F FALL RISK ASSESSMENT DOCD: CPT | Mod: S$GLB,,, | Performed by: INTERNAL MEDICINE

## 2021-08-12 PROCEDURE — 1159F PR MEDICATION LIST DOCUMENTED IN MEDICAL RECORD: ICD-10-PCS | Mod: S$GLB,,, | Performed by: INTERNAL MEDICINE

## 2021-08-12 PROCEDURE — 1126F PR PAIN SEVERITY QUANTIFIED, NO PAIN PRESENT: ICD-10-PCS | Mod: S$GLB,,, | Performed by: INTERNAL MEDICINE

## 2021-08-12 PROCEDURE — 93005 EKG 12-LEAD: ICD-10-PCS | Mod: S$GLB,,, | Performed by: INTERNAL MEDICINE

## 2021-08-12 PROCEDURE — 93010 ELECTROCARDIOGRAM REPORT: CPT | Mod: S$GLB,,, | Performed by: GENERAL PRACTICE

## 2021-08-12 PROCEDURE — 3008F PR BODY MASS INDEX (BMI) DOCUMENTED: ICD-10-PCS | Mod: S$GLB,,, | Performed by: INTERNAL MEDICINE

## 2021-08-12 PROCEDURE — 3079F DIAST BP 80-89 MM HG: CPT | Mod: S$GLB,,, | Performed by: INTERNAL MEDICINE

## 2021-08-12 PROCEDURE — 1101F PR PT FALLS ASSESS DOC 0-1 FALLS W/OUT INJ PAST YR: ICD-10-PCS | Mod: S$GLB,,, | Performed by: INTERNAL MEDICINE

## 2021-08-12 PROCEDURE — 3008F BODY MASS INDEX DOCD: CPT | Mod: S$GLB,,, | Performed by: INTERNAL MEDICINE

## 2021-08-12 PROCEDURE — 93005 ELECTROCARDIOGRAM TRACING: CPT | Mod: S$GLB,,, | Performed by: INTERNAL MEDICINE

## 2021-08-12 PROCEDURE — 99214 OFFICE O/P EST MOD 30 MIN: CPT | Mod: S$GLB,,, | Performed by: INTERNAL MEDICINE

## 2021-08-12 PROCEDURE — 3079F PR MOST RECENT DIASTOLIC BLOOD PRESSURE 80-89 MM HG: ICD-10-PCS | Mod: S$GLB,,, | Performed by: INTERNAL MEDICINE

## 2021-08-12 PROCEDURE — 3044F PR MOST RECENT HEMOGLOBIN A1C LEVEL <7.0%: ICD-10-PCS | Mod: S$GLB,,, | Performed by: INTERNAL MEDICINE

## 2021-08-12 PROCEDURE — 1159F MED LIST DOCD IN RCRD: CPT | Mod: S$GLB,,, | Performed by: INTERNAL MEDICINE

## 2021-08-12 PROCEDURE — 99214 PR OFFICE/OUTPT VISIT, EST, LEVL IV, 30-39 MIN: ICD-10-PCS | Mod: S$GLB,,, | Performed by: INTERNAL MEDICINE

## 2021-08-12 PROCEDURE — 3044F HG A1C LEVEL LT 7.0%: CPT | Mod: S$GLB,,, | Performed by: INTERNAL MEDICINE

## 2021-08-12 PROCEDURE — 93010 EKG 12-LEAD: ICD-10-PCS | Mod: S$GLB,,, | Performed by: GENERAL PRACTICE

## 2021-08-12 PROCEDURE — 1160F PR REVIEW ALL MEDS BY PRESCRIBER/CLIN PHARMACIST DOCUMENTED: ICD-10-PCS | Mod: S$GLB,,, | Performed by: INTERNAL MEDICINE

## 2021-08-12 PROCEDURE — 3075F SYST BP GE 130 - 139MM HG: CPT | Mod: S$GLB,,, | Performed by: INTERNAL MEDICINE

## 2021-08-12 PROCEDURE — 1126F AMNT PAIN NOTED NONE PRSNT: CPT | Mod: S$GLB,,, | Performed by: INTERNAL MEDICINE

## 2021-08-12 PROCEDURE — 3288F PR FALLS RISK ASSESSMENT DOCUMENTED: ICD-10-PCS | Mod: S$GLB,,, | Performed by: INTERNAL MEDICINE

## 2021-08-12 PROCEDURE — 1101F PT FALLS ASSESS-DOCD LE1/YR: CPT | Mod: S$GLB,,, | Performed by: INTERNAL MEDICINE

## 2021-08-12 PROCEDURE — 1160F RVW MEDS BY RX/DR IN RCRD: CPT | Mod: S$GLB,,, | Performed by: INTERNAL MEDICINE

## 2021-08-18 RX ORDER — AMLODIPINE BESYLATE 5 MG/1
5 TABLET ORAL DAILY
Qty: 90 TABLET | Refills: 3 | Status: SHIPPED | OUTPATIENT
Start: 2021-08-18 | End: 2021-12-09 | Stop reason: SDUPTHER

## 2021-08-18 RX ORDER — PRAVASTATIN SODIUM 20 MG/1
20 TABLET ORAL NIGHTLY
Qty: 90 TABLET | Refills: 3 | Status: SHIPPED | OUTPATIENT
Start: 2021-08-18 | End: 2022-05-27 | Stop reason: SDUPTHER

## 2021-08-18 NOTE — TELEPHONE ENCOUNTER
----- Message from Kary Diaz, Patient Care Assistant sent at 8/18/2021 10:05 AM CDT -----  Regarding: refill  Contact: pt  Type:  RX Refill Request    Who Called:  pt   Refill or New Rx:  refill  RX Name and Strength:  amLODIPine (NORVASC) 5 MG tablet  pravastatin (PRAVACHOL) 20 MG tablet    How is the patient currently taking it? 1Xday  Is this a 30 day or 90 day RX:  90  Preferred Pharmacy with phone number:    [facility] - [address] [address]  Phone: [phone] Fax: [phone]    Local or Mail Order:  mail  Ordering Provider:  mary kay Walter Call Back Number:  [phone] (home)   Additional Information:  please call pt to advise. Thanks!

## 2021-09-29 ENCOUNTER — HOSPITAL ENCOUNTER (OUTPATIENT)
Dept: RADIOLOGY | Facility: HOSPITAL | Age: 71
Discharge: HOME | End: 2021-09-29
Attending: SPECIALIST
Payer: MEDICARE

## 2021-09-29 DIAGNOSIS — Z12.31 OTHER SCREENING MAMMOGRAM: ICD-10-CM

## 2021-09-29 PROCEDURE — 77063 MAMMO DIGITAL SCREENING BILAT WITH TOMO: ICD-10-PCS | Mod: 26,,, | Performed by: RADIOLOGY

## 2021-09-29 PROCEDURE — 77063 BREAST TOMOSYNTHESIS BI: CPT | Mod: 26,,, | Performed by: RADIOLOGY

## 2021-09-29 PROCEDURE — 77067 SCR MAMMO BI INCL CAD: CPT | Mod: TC

## 2021-09-29 PROCEDURE — 77067 MAMMO DIGITAL SCREENING BILAT WITH TOMO: ICD-10-PCS | Mod: 26,,, | Performed by: RADIOLOGY

## 2021-09-29 PROCEDURE — 77067 SCR MAMMO BI INCL CAD: CPT | Mod: 26,,, | Performed by: RADIOLOGY

## 2021-10-18 ENCOUNTER — HOSPITAL ENCOUNTER (OUTPATIENT)
Dept: RADIOLOGY | Facility: HOSPITAL | Age: 71
Discharge: HOME | End: 2021-10-18
Attending: SPECIALIST
Payer: MEDICARE

## 2021-10-18 DIAGNOSIS — N63.10 MASS OF RIGHT BREAST: ICD-10-CM

## 2021-10-18 DIAGNOSIS — N64.89 GALACTOCELE: ICD-10-CM

## 2021-10-18 PROCEDURE — 77065 DX MAMMO INCL CAD UNI: CPT | Mod: 26,RT,, | Performed by: RADIOLOGY

## 2021-10-18 PROCEDURE — 77065 MAMMO DIGITAL DIAGNOSTIC RIGHT WITH TOMO: ICD-10-PCS | Mod: 26,RT,, | Performed by: RADIOLOGY

## 2021-10-18 PROCEDURE — 77061 BREAST TOMOSYNTHESIS UNI: CPT | Mod: 26,,, | Performed by: RADIOLOGY

## 2021-10-18 PROCEDURE — 77061 BREAST TOMOSYNTHESIS UNI: CPT | Mod: TC,RT

## 2021-10-18 PROCEDURE — 77061 MAMMO DIGITAL DIAGNOSTIC RIGHT WITH TOMO: ICD-10-PCS | Mod: 26,,, | Performed by: RADIOLOGY

## 2021-12-09 RX ORDER — AMLODIPINE BESYLATE 5 MG/1
5 TABLET ORAL DAILY
Qty: 90 TABLET | Refills: 3 | Status: SHIPPED | OUTPATIENT
Start: 2021-12-09 | End: 2022-11-16

## 2022-01-20 ENCOUNTER — HOSPITAL ENCOUNTER (OUTPATIENT)
Dept: RADIOLOGY | Facility: CLINIC | Age: 72
Discharge: HOME | End: 2022-01-20
Attending: PODIATRIST
Payer: MEDICARE

## 2022-01-20 ENCOUNTER — OFFICE VISIT (OUTPATIENT)
Dept: PODIATRY | Facility: CLINIC | Age: 72
End: 2022-01-20
Payer: MEDICARE

## 2022-01-20 VITALS — RESPIRATION RATE: 16 BRPM | WEIGHT: 170 LBS | HEIGHT: 67 IN | BODY MASS INDEX: 26.68 KG/M2

## 2022-01-20 DIAGNOSIS — M21.6X2 ACQUIRED BILATERAL PES CAVUS: Primary | ICD-10-CM

## 2022-01-20 DIAGNOSIS — M76.71 PERONEAL TENDINITIS OF BOTH LOWER LEGS: ICD-10-CM

## 2022-01-20 DIAGNOSIS — M21.6X1 ACQUIRED BILATERAL PES CAVUS: Primary | ICD-10-CM

## 2022-01-20 DIAGNOSIS — M79.671 RIGHT FOOT PAIN: ICD-10-CM

## 2022-01-20 DIAGNOSIS — M25.371 ANKLE INSTABILITY, RIGHT: ICD-10-CM

## 2022-01-20 DIAGNOSIS — M76.72 PERONEAL TENDINITIS OF BOTH LOWER LEGS: ICD-10-CM

## 2022-01-20 DIAGNOSIS — M79.672 LEFT FOOT PAIN: ICD-10-CM

## 2022-01-20 PROCEDURE — 99204 PR OFFICE/OUTPT VISIT, NEW, LEVL IV, 45-59 MIN: ICD-10-PCS | Mod: S$GLB,,, | Performed by: PODIATRIST

## 2022-01-20 PROCEDURE — 99204 OFFICE O/P NEW MOD 45 MIN: CPT | Mod: S$GLB,,, | Performed by: PODIATRIST

## 2022-01-20 PROCEDURE — 73630 XR FOOT COMPLETE 3 VIEW BILATERAL: ICD-10-PCS | Mod: 50,S$GLB,, | Performed by: RADIOLOGY

## 2022-01-20 PROCEDURE — 1159F MED LIST DOCD IN RCRD: CPT | Mod: CPTII,S$GLB,, | Performed by: PODIATRIST

## 2022-01-20 PROCEDURE — 1101F PR PT FALLS ASSESS DOC 0-1 FALLS W/OUT INJ PAST YR: ICD-10-PCS | Mod: CPTII,S$GLB,, | Performed by: PODIATRIST

## 2022-01-20 PROCEDURE — 1160F RVW MEDS BY RX/DR IN RCRD: CPT | Mod: CPTII,S$GLB,, | Performed by: PODIATRIST

## 2022-01-20 PROCEDURE — 3008F PR BODY MASS INDEX (BMI) DOCUMENTED: ICD-10-PCS | Mod: CPTII,S$GLB,, | Performed by: PODIATRIST

## 2022-01-20 PROCEDURE — 3008F BODY MASS INDEX DOCD: CPT | Mod: CPTII,S$GLB,, | Performed by: PODIATRIST

## 2022-01-20 PROCEDURE — 3288F PR FALLS RISK ASSESSMENT DOCUMENTED: ICD-10-PCS | Mod: CPTII,S$GLB,, | Performed by: PODIATRIST

## 2022-01-20 PROCEDURE — 1160F PR REVIEW ALL MEDS BY PRESCRIBER/CLIN PHARMACIST DOCUMENTED: ICD-10-PCS | Mod: CPTII,S$GLB,, | Performed by: PODIATRIST

## 2022-01-20 PROCEDURE — 1101F PT FALLS ASSESS-DOCD LE1/YR: CPT | Mod: CPTII,S$GLB,, | Performed by: PODIATRIST

## 2022-01-20 PROCEDURE — 73630 X-RAY EXAM OF FOOT: CPT | Mod: 50,S$GLB,, | Performed by: RADIOLOGY

## 2022-01-20 PROCEDURE — 1159F PR MEDICATION LIST DOCUMENTED IN MEDICAL RECORD: ICD-10-PCS | Mod: CPTII,S$GLB,, | Performed by: PODIATRIST

## 2022-01-20 PROCEDURE — 1125F AMNT PAIN NOTED PAIN PRSNT: CPT | Mod: CPTII,S$GLB,, | Performed by: PODIATRIST

## 2022-01-20 PROCEDURE — 1125F PR PAIN SEVERITY QUANTIFIED, PAIN PRESENT: ICD-10-PCS | Mod: CPTII,S$GLB,, | Performed by: PODIATRIST

## 2022-01-20 PROCEDURE — 3288F FALL RISK ASSESSMENT DOCD: CPT | Mod: CPTII,S$GLB,, | Performed by: PODIATRIST

## 2022-01-20 RX ORDER — CLOTRIMAZOLE AND BETAMETHASONE DIPROPIONATE 10; .64 MG/G; MG/G
1 CREAM TOPICAL
COMMUNITY
Start: 2021-08-10 | End: 2023-06-14

## 2022-01-20 NOTE — PROGRESS NOTES
"  [address] [facility] [address]  [place] [address]  Phone: [phone]   [phone]    Patient's PCP:Krystle Vargas MD  Referring Provider: Aaareferral Self    Subjective:      Chief Complaint:: Foot Pain (Left plantar lateral arch to heel pain)    HPI  Lorraine Moreno is a 71 y.o. female who presents with a complaint of pain in left plantar lateral aspect from arch to heel lasting since 12/2021.  Current symptoms include aching pains.  Aggravating factors are stepping out of bed first thing in AM, sitting down then getting back up.  Treatment to date have included soaking in hot/cold water, running shoes, elevating, icing, frozen water bottle.    Right foot turns outward when she walks.  A little painful.  Wants to discuss this as well.      Vitals:    01/20/22 1552   Resp: 16   Weight: 77.1 kg (170 lb)   Height: 5' 7" (1.702 m)   PainSc: 10-Worst pain ever      Shoe Size: 9 / 9.5    Past Surgical History:   Procedure Laterality Date    ANKLE SURGERY Right     EYE SURGERY      HIP ARTHROPLASTY      8/18/15 right JACKELYN /J&J Depuy press fit    JOINT REPLACEMENT Right 12/12/2018    TKA    LASIK Bilateral     TOTAL KNEE ARTHROPLASTY Right 12/12/2018    Procedure: ARTHROPLASTY, KNEE, TOTAL;  Surgeon: Malik Herzog MD;  Location: [facility];  Service: Orthopedics;  Laterality: Right;    TUBAL LIGATION       Past Medical History:   Diagnosis Date    Back pain     Bruxism, sleep-related     wears appliance    H/O total hip arthroplasty, right     Hypercholesterolemia     Hypertension     Obesity     Primary osteoarthritis of knees, bilateral     Primary osteoarthritis of right knee 11/2018    Urinary incontinence      Family History   Problem Relation Age of Onset    No Known Problems Mother     Heart disease Father     Heart attack Father     Early death Father 55        heart attack    No Known Problems Brother     No Known Problems Brother     Psoriasis Neg Hx     Melanoma Neg Hx     " Lupus Neg Hx     Eczema Neg Hx         Social History:   Marital Status:   Alcohol History:  reports current alcohol use of about 5.0 standard drinks of alcohol per week.  Tobacco History:  reports that she has never smoked. She has never used smokeless tobacco.  Drug History:  reports no history of drug use.    Review of patient's allergies indicates:  No Known Allergies    Current Outpatient Medications   Medication Sig Dispense Refill    amLODIPine (NORVASC) 5 MG tablet Take 1 tablet (5 mg total) by mouth once daily. 90 tablet 3    CALCIUM CARBONATE (CALCIUM 300 ORAL) Take 1,200 mg by mouth once daily. pt is staying with daughter left supplements at home she states she will resume them when she returns      clotrimazole-betamethasone 1-0.05% (LOTRISONE) cream Apply topically as needed.      flaxseed 1,000 mg Cap Take 1 capsule by mouth once daily. pt is staying with daughter left supplements at home she states she will resume them when she returns      HYDROcodone-acetaminophen (NORCO)  mg per tablet Take 1 tablet by mouth every 12 (twelve) hours as needed. 60 tablet 0    MAGNESIUM ORAL Take 500 mg by mouth every evening. pt is staying with daughter left supplements at home she states she will resume them when she returns      multivitamin (THERAGRAN) per tablet Take 1 tablet by mouth once daily. pt is staying with daughter left supplements at home she states she will resume them when she returns      oxybutynin (DITROPAN) 5 MG Tab Take 5 mg by mouth once daily.   12    pravastatin (PRAVACHOL) 20 MG tablet Take 1 tablet (20 mg total) by mouth every evening. 90 tablet 3     No current facility-administered medications for this visit.       Review of Systems   Constitutional: Negative for chills, fatigue, fever and unexpected weight change.   HENT: Negative for hearing loss and trouble swallowing.    Eyes: Negative for photophobia and visual disturbance.   Respiratory: Negative for cough,  shortness of breath and wheezing.    Cardiovascular: Negative for chest pain, palpitations and leg swelling.   Gastrointestinal: Negative for abdominal pain and nausea.   Genitourinary: Negative for dysuria and frequency.   Musculoskeletal: Negative for arthralgias, back pain, gait problem, joint swelling and myalgias.   Skin: Negative for rash and wound.   Neurological: Negative for tremors, seizures, weakness, numbness and headaches.   Hematological: Does not bruise/bleed easily.         Objective:        Physical Exam:   Foot Exam    General  General Appearance: appears stated age and healthy   Orientation: alert and oriented to person, place, and time   Affect: appropriate   Gait: antalgic       Right Foot/Ankle     Inspection and Palpation  Ecchymosis: none  Tenderness: fifth metatarsal base tenderness   Swelling: fifth metatarsal base   Arch: pes cavus  Hammertoes: second toe, third toe, fourth toe and fifth toe  Claw Toes: absent  Hallux valgus: no  Hallux limitus: no  Skin Exam: skin intact;   Neurovascular  Dorsalis pedis: 2+  Posterior tibial: 2+  Capillary Refill: 2+  Saphenous nerve sensation: normal  Tibial nerve sensation: normal  Superficial peroneal nerve sensation: normal  Deep peroneal nerve sensation: normal  Sural nerve sensation: normal    Muscle Strength  Ankle dorsiflexion: 5  Ankle plantar flexion: 5  Ankle inversion: 5  Ankle eversion: 5  Great toe extension: 5  Great toe flexion: 5    Range of Motion    Normal right ankle ROM    Tests  Anterior drawer: postive   Varus tilt: positive   Talar tilt: positive       Left Foot/Ankle      Inspection and Palpation  Ecchymosis: none  Tenderness: fifth metatarsal base tenderness   Swelling: fifth metatarsal base   Arch: pes cavus  Hammertoes: second toe, third toe, fourth toe and fifth toe  Claw toes: absent  Hallux valgus: no  Hallux limitus: no  Skin Exam: skin intact;   Neurovascular  Dorsalis pedis: 2+  Posterior tibial: 2+  Capillary refill:  2+  Saphenous nerve sensation: normal  Tibial nerve sensation: normal  Superficial peroneal nerve sensation: normal  Deep peroneal nerve sensation: normal  Sural nerve sensation: normal    Muscle Strength  Ankle dorsiflexion: 5  Ankle plantar flexion: 5  Ankle inversion: 5  Ankle eversion: 5  Great toe extension: 5  Great toe flexion: 5    Range of Motion    Normal left ankle ROM    Tests  Anterior drawer: negative   Varus Tilt: negative   Calcaneal squeeze: negative   Talar tilt: negative           Physical Exam  Cardiovascular:      Pulses:           Dorsalis pedis pulses are 2+ on the right side and 2+ on the left side.        Posterior tibial pulses are 2+ on the right side and 2+ on the left side.   Musculoskeletal:      Right ankle: Anterior drawer test positive.      Right foot: No bunion.      Left foot: No bunion.        Feet:          Imaging:   AP, lateral, lateral oblique weight-bearing x-rays bilateral feet and axial calcaneal weight-bearing x-ray left foot:  No acute fractures, no bone tumors, no soft tissue masses seen.  Pes cavus foot structure bilateral right worse than left.  Degenerative joint disease lateral Lisfranc joint and calcaneocuboid joint.  Metal in distal fibula right ankle previous surgery by another position.  Osteopenia present.  Posterior and inferior calcaneal exostosis and heterotrophic calcification Achilles tendon.         Assessment:       1. Acquired bilateral pes cavus    2. Left foot pain    3. Peroneal tendinitis of both lower legs    4. Right foot pain    5. Ankle instability, right      Plan:   Acquired bilateral pes cavus    Left foot pain  -     X-Ray Foot Complete Bilateral    Peroneal tendinitis of both lower legs    Right foot pain  -     X-Ray Foot Complete Bilateral    Ankle instability, right    I evaluated patient had a long discussion about the clinical and x-ray findings of pes cavovarus foot structure bilateral.  I described to the x-ray changes consistent  with this as well as the changes on the 5th metatarsal base bilateral right worse than left which is due to overloading of the lateral aspect of the foot secondary to the foot structure.  Also the right foot has some ankle instability and has a tendency to be in a more varus position than the left foot.  The left foot is showing some overloading of the lateral side but not as bad as the right foot.  I discussed with her conservative care of taking her custom-made orthotics having a lateral buildup placed on them to help control some of the supination.  Today were placing some felt on the lateral side from the heel to the 5th metatarsal head on her orthotics to see if this pronates her slightly and makes her feel more stable.  If it does I recommend she get her custom-made orthotics modified.  I am not recommending any other treatment at this time other than ice and anti-inflammatories.  If symptoms get better with the inserts shoe use them routinely.  If I need to a will send her to physical therapy.  She return as needed.      Procedures          Counseling:     I provided patient education verbally regarding:   Patient diagnosis, treatment options, as well as alternatives, risks, and benefits.     This note was created using Dragon voice recognition software that occasionally misinterpreted phrases or words.

## 2022-02-07 ENCOUNTER — TELEPHONE (OUTPATIENT)
Dept: CARDIOLOGY | Facility: CLINIC | Age: 72
End: 2022-02-07
Payer: MEDICARE

## 2022-02-07 DIAGNOSIS — E78.2 MIXED HYPERLIPIDEMIA: Primary | ICD-10-CM

## 2022-02-07 NOTE — TELEPHONE ENCOUNTER
----- Message from Nash Eldridge sent at 2/7/2022  2:28 PM CST -----  Contact: pt  Type: Needs Medical Advice    Who Called: pt    Best Call Back Number:[phone]    Additional Information: Requesting callback regarding needing a call back from nurse, needing to get bloodwork this week and wanting to make sure if it check thyroid if not she would like to add it     Please Advise-Thank you

## 2022-02-10 ENCOUNTER — TELEPHONE (OUTPATIENT)
Dept: CARDIOLOGY | Facility: CLINIC | Age: 72
End: 2022-02-10
Payer: MEDICARE

## 2022-02-10 NOTE — TELEPHONE ENCOUNTER
Lm for patient stating that we had talked about me putting in her thyroid labs yesterday and that her orders will be at the .

## 2022-02-10 NOTE — TELEPHONE ENCOUNTER
----- Message from Alma Delia Bernal sent at 2/10/2022  9:51 AM CST -----  Regarding: bloodwork orders  Contact: Patient  Patient want to know if she can  orders for bloodwork today please add thyroid, any questions please call back at [phone] (home)

## 2022-02-16 ENCOUNTER — OFFICE VISIT (OUTPATIENT)
Dept: CARDIOLOGY | Facility: CLINIC | Age: 72
End: 2022-02-16
Payer: MEDICARE

## 2022-02-16 VITALS
BODY MASS INDEX: 27.47 KG/M2 | WEIGHT: 175 LBS | HEIGHT: 67 IN | HEART RATE: 80 BPM | RESPIRATION RATE: 16 BRPM | SYSTOLIC BLOOD PRESSURE: 136 MMHG | DIASTOLIC BLOOD PRESSURE: 80 MMHG

## 2022-02-16 DIAGNOSIS — I10 ESSENTIAL HYPERTENSION: Primary | ICD-10-CM

## 2022-02-16 DIAGNOSIS — E78.2 MIXED HYPERLIPIDEMIA: ICD-10-CM

## 2022-02-16 DIAGNOSIS — E66.9 OBESITY (BMI 30.0-34.9): ICD-10-CM

## 2022-02-16 DIAGNOSIS — M17.11 PRIMARY OSTEOARTHRITIS OF RIGHT KNEE: ICD-10-CM

## 2022-02-16 PROCEDURE — 99214 PR OFFICE/OUTPT VISIT, EST, LEVL IV, 30-39 MIN: ICD-10-PCS | Mod: S$GLB,,, | Performed by: INTERNAL MEDICINE

## 2022-02-16 PROCEDURE — 3288F FALL RISK ASSESSMENT DOCD: CPT | Mod: CPTII,S$GLB,, | Performed by: INTERNAL MEDICINE

## 2022-02-16 PROCEDURE — 1159F PR MEDICATION LIST DOCUMENTED IN MEDICAL RECORD: ICD-10-PCS | Mod: CPTII,S$GLB,, | Performed by: INTERNAL MEDICINE

## 2022-02-16 PROCEDURE — 3079F PR MOST RECENT DIASTOLIC BLOOD PRESSURE 80-89 MM HG: ICD-10-PCS | Mod: CPTII,S$GLB,, | Performed by: INTERNAL MEDICINE

## 2022-02-16 PROCEDURE — 3288F PR FALLS RISK ASSESSMENT DOCUMENTED: ICD-10-PCS | Mod: CPTII,S$GLB,, | Performed by: INTERNAL MEDICINE

## 2022-02-16 PROCEDURE — 3008F BODY MASS INDEX DOCD: CPT | Mod: CPTII,S$GLB,, | Performed by: INTERNAL MEDICINE

## 2022-02-16 PROCEDURE — 1101F PR PT FALLS ASSESS DOC 0-1 FALLS W/OUT INJ PAST YR: ICD-10-PCS | Mod: CPTII,S$GLB,, | Performed by: INTERNAL MEDICINE

## 2022-02-16 PROCEDURE — 93000 ELECTROCARDIOGRAM COMPLETE: CPT | Mod: S$GLB,,, | Performed by: INTERNAL MEDICINE

## 2022-02-16 PROCEDURE — 3075F SYST BP GE 130 - 139MM HG: CPT | Mod: CPTII,S$GLB,, | Performed by: INTERNAL MEDICINE

## 2022-02-16 PROCEDURE — 1160F PR REVIEW ALL MEDS BY PRESCRIBER/CLIN PHARMACIST DOCUMENTED: ICD-10-PCS | Mod: CPTII,S$GLB,, | Performed by: INTERNAL MEDICINE

## 2022-02-16 PROCEDURE — 93000 EKG 12-LEAD: ICD-10-PCS | Mod: S$GLB,,, | Performed by: INTERNAL MEDICINE

## 2022-02-16 PROCEDURE — 1126F AMNT PAIN NOTED NONE PRSNT: CPT | Mod: CPTII,S$GLB,, | Performed by: INTERNAL MEDICINE

## 2022-02-16 PROCEDURE — 1126F PR PAIN SEVERITY QUANTIFIED, NO PAIN PRESENT: ICD-10-PCS | Mod: CPTII,S$GLB,, | Performed by: INTERNAL MEDICINE

## 2022-02-16 PROCEDURE — 99214 OFFICE O/P EST MOD 30 MIN: CPT | Mod: S$GLB,,, | Performed by: INTERNAL MEDICINE

## 2022-02-16 PROCEDURE — 1159F MED LIST DOCD IN RCRD: CPT | Mod: CPTII,S$GLB,, | Performed by: INTERNAL MEDICINE

## 2022-02-16 PROCEDURE — 3079F DIAST BP 80-89 MM HG: CPT | Mod: CPTII,S$GLB,, | Performed by: INTERNAL MEDICINE

## 2022-02-16 PROCEDURE — 3075F PR MOST RECENT SYSTOLIC BLOOD PRESS GE 130-139MM HG: ICD-10-PCS | Mod: CPTII,S$GLB,, | Performed by: INTERNAL MEDICINE

## 2022-02-16 PROCEDURE — 1160F RVW MEDS BY RX/DR IN RCRD: CPT | Mod: CPTII,S$GLB,, | Performed by: INTERNAL MEDICINE

## 2022-02-16 PROCEDURE — 3008F PR BODY MASS INDEX (BMI) DOCUMENTED: ICD-10-PCS | Mod: CPTII,S$GLB,, | Performed by: INTERNAL MEDICINE

## 2022-02-16 PROCEDURE — 1101F PT FALLS ASSESS-DOCD LE1/YR: CPT | Mod: CPTII,S$GLB,, | Performed by: INTERNAL MEDICINE

## 2022-02-16 NOTE — PROGRESS NOTES
Subjective:    Patient ID:  Lorraine Moreno is a 71 y.o. female     Chief Complaint   Patient presents with    Hypertension       HPI:  Ms Lorraine Moreno is a 71 y.o. female she of for follow-up.  Patient has been doing good with no specific complaints at the present time.  She does have hip pain and she does have arthritis in the hip and she is due to see her orthopedic doctor.  Her breathing is good denies any shortness of breath or difficulty in breathing denies any chest pain or tightness or heaviness denies any dizziness or lightheadedness denies any loss of consciousness falls or head injury.      Review of patient's allergies indicates:  No Known Allergies    Past Medical History:   Diagnosis Date    Back pain     Bruxism, sleep-related     wears appliance    H/O total hip arthroplasty, right     Hypercholesterolemia     Hypertension     Obesity     Primary osteoarthritis of knees, bilateral     Primary osteoarthritis of right knee 11/2018    Urinary incontinence      Past Surgical History:   Procedure Laterality Date    ANKLE SURGERY Right     EYE SURGERY      HIP ARTHROPLASTY      8/18/15 right JACKELYN /J&J Depuy press fit    JOINT REPLACEMENT Right 12/12/2018    TKA    LASIK Bilateral     TOTAL KNEE ARTHROPLASTY Right 12/12/2018    Procedure: ARTHROPLASTY, KNEE, TOTAL;  Surgeon: Malik Herzog MD;  Location: [facility];  Service: Orthopedics;  Laterality: Right;    TUBAL LIGATION       Social History     Tobacco Use    Smoking status: Never Smoker    Smokeless tobacco: Never Used   Substance Use Topics    Alcohol use: Yes     Alcohol/week: 5.0 standard drinks     Types: 5 Glasses of wine per week    Drug use: No     Family History   Problem Relation Age of Onset    No Known Problems Mother     Heart disease Father     Heart attack Father     Early death Father 55        heart attack    No Known Problems Brother     No Known Problems Brother     Psoriasis Neg Hx     Melanoma Neg Hx      Lupus Neg Hx     Eczema Neg Hx         Review of Systems:   Constitution: Negative for diaphoresis and fever.   HEENT: Negative for nosebleeds.    Cardiovascular: Negative for chest pain       No dyspnea on exertion       No leg swelling        No palpitations  Respiratory: Negative for shortness of breath and wheezing.    Hematologic/Lymphatic: Negative for bleeding problem. Does not bruise/bleed easily.   Skin: Negative for color change and rash.   Musculoskeletal: Negative for falls and myalgias.   Gastrointestinal: Negative for hematemesis and hematochezia.   Genitourinary: Negative for hematuria.   Neurological: Negative for dizziness and light-headedness.   Psychiatric/Behavioral: Negative for altered mental status and memory loss.          Objective:        Vitals:    02/16/22 1410   BP: 136/80   Pulse: 80   Resp: 16       Lab Results   Component Value Date    WBC 4.4 02/11/2022    HGB 11.9 02/11/2022    HCT 37.1 02/11/2022     02/11/2022    CHOL 183 02/11/2022    TRIG 73 02/11/2022    HDL 83 02/11/2022    ALT 10 02/11/2022    AST 15 02/11/2022     02/11/2022    K 4.9 02/11/2022     02/11/2022    CREATININE 1.00 (H) 02/11/2022    BUN 26 (H) 02/11/2022    CO2 27 02/11/2022    TSH 3.83 02/11/2022    INR 1.0 11/29/2018    HGBA1C 5.0 04/13/2021        ECHOCARDIOGRAM RESULTS  Results for orders placed during the hospital encounter of 02/02/22    Echo    Interpretation Summary  · The left ventricle is normal in size with concentric remodeling and normal systolic function.  · The estimated ejection fraction is 57%.  · Normal left ventricular diastolic function.  · Normal right ventricular size with normal right ventricular systolic function.        CURRENT/PREVIOUS VISIT EKG  Results for orders placed or performed in visit on 08/12/21   IN OFFICE EKG 12-LEAD (to [place])    Collection Time: 08/12/21  2:50 PM    Narrative    Test Reason : I10,    Vent. Rate : 072 BPM     Atrial Rate : 072 BPM     P-R  Int : 154 ms          QRS Dur : 076 ms      QT Int : 382 ms       P-R-T Axes : 049 029 064 degrees     QTc Int : 418 ms    Normal sinus rhythm  Normal ECG  No previous ECGs available  Confirmed by Darrell MORRIS, Martínez WALLACE ([id number]) on 8/20/2021 2:09:51 PM    Referred By:  CB           Confirmed By:Martínez Ramírez MD     No valid procedures specified.   No results found for this or any previous visit.      Physical Exam:  CONSTITUTIONAL: No fever, no chills  HEENT: Normocephalic, atraumatic,pupils reactive to light                 NECK:  No JVD no carotid bruit  CVS: S1S2+, RRR, no murmurs,   LUNGS: Clear  ABDOMEN: Soft, NT, BS+  EXTREMITIES: No cyanosis, edema  : No kevin catheter  NEURO: AAO X 3  PSY: Normal affect      Medication List with Changes/Refills   Current Medications    AMLODIPINE (NORVASC) 5 MG TABLET    Take 1 tablet (5 mg total) by mouth once daily.    CALCIUM CARBONATE (CALCIUM 300 ORAL)    Take 1,200 mg by mouth once daily. pt is staying with daughter left supplements at home she states she will resume them when she returns    CLOTRIMAZOLE-BETAMETHASONE 1-0.05% (LOTRISONE) CREAM    Apply topically as needed.    FLAXSEED 1,000 MG CAP    Take 1 capsule by mouth once daily. pt is staying with daughter left supplements at home she states she will resume them when she returns    HYDROCODONE-ACETAMINOPHEN (NORCO)  MG PER TABLET    Take 1 tablet by mouth every 12 (twelve) hours as needed.    MAGNESIUM ORAL    Take 500 mg by mouth every evening. pt is staying with daughter left supplements at home she states she will resume them when she returns    MULTIVITAMIN (THERAGRAN) PER TABLET    Take 1 tablet by mouth once daily. pt is staying with daughter left supplements at home she states she will resume them when she returns    OXYBUTYNIN (DITROPAN) 5 MG TAB    Take 5 mg by mouth once daily.     PRAVASTATIN (PRAVACHOL) 20 MG TABLET    Take 1 tablet (20 mg total) by mouth every evening.             Assessment:        1. Essential hypertension    2. Mixed hyperlipidemia    3. Obesity (BMI 30.0-34.9)    4. Primary osteoarthritis of right knee         Plan:   1. Patient has been doing well her blood pressure is 136/80 and she is currently on amlodipine 5 mg p.o. q.day.  2. She is on pravastatin 20 mg p.o. q.h.s. continue the same and her cholesterol was total cholesterol was 183 HDL was 83 LDL was 84 and triglycerides were 73.  3. Reviewed the EKG independently patient is in normal sinus rhythm with heart rate of 80 beats per minute essentially within normal limits.  4. Patient had an echocardiogram done which showed normal LV function normal RV function with ejection fraction of 57%.  5. Patient also had blood work done she has prerenal azotemia with BUN at 26 discussed with patient to continue to drink fluids more frequently and keep herself well hydrated  She has stable hemoglobin at 11.9 she needs further evaluation for her borderline anemia.  Discussed with patient that she would need to follow up with the primary physician.  And she would need further workup including possibly a colonoscopy given the age.  6. Patient to continue her current management and I will see back in the office in 6 months time.        Problem List Items Addressed This Visit        Cardiac/Vascular    Essential hypertension - Primary    Relevant Orders    IN OFFICE EKG 12-LEAD (to [place])    Hyperlipidemia       Endocrine    Obesity (BMI 30.0-34.9)       Orthopedic    Primary osteoarthritis of right knee          No follow-ups on file.

## 2022-03-02 ENCOUNTER — TELEPHONE (OUTPATIENT)
Dept: CARDIOLOGY | Facility: CLINIC | Age: 72
End: 2022-03-02
Payer: MEDICARE

## 2022-03-02 NOTE — LETTER
2022    Lorraine Moreno  [address] [place] [address]             [facility] - Cardiology  [address] [facility] [facility] [address]  [place] [address]  Phone: [phone]  Fax: [phone] Patient: Lorraine Moreno  : 1950  Referring Doctor: [facility]  Procedure: L TKA    Current Outpatient Medications   Medication Sig    amLODIPine (NORVASC) 5 MG tablet Take 1 tablet (5 mg total) by mouth once daily.    CALCIUM CARBONATE (CALCIUM 300 ORAL) Take 1,200 mg by mouth once daily. pt is staying with daughter left supplements at home she states she will resume them when she returns    clotrimazole-betamethasone 1-0.05% (LOTRISONE) cream Apply topically as needed.    flaxseed 1,000 mg Cap Take 1 capsule by mouth once daily. pt is staying with daughter left supplements at home she states she will resume them when she returns    HYDROcodone-acetaminophen (NORCO)  mg per tablet Take 1 tablet by mouth every 12 (twelve) hours as needed.    MAGNESIUM ORAL Take 500 mg by mouth every evening. pt is staying with daughter left supplements at home she states she will resume them when she returns    multivitamin (THERAGRAN) per tablet Take 1 tablet by mouth once daily. pt is staying with daughter left supplements at home she states she will resume them when she returns    oxybutynin (DITROPAN) 5 MG Tab Take 5 mg by mouth once daily.     pravastatin (PRAVACHOL) 20 MG tablet Take 1 tablet (20 mg total) by mouth every evening.     No current facility-administered medications for this visit.       This patient has been assessed for risk factors for clearance of surgery with the following stipulations:    ___ No contraindications  ___ Recommendations for antiplatelet/anticoagulant medications:  _x__ Cleared for surgery with the following contraindications/precautions: mild to moderate risks.  ___ Not cleared for surgery due to the following reasons:      If you have any questions regarding the above, please contact my  office at [phone].    Sincerely,  ..  '

## 2022-03-02 NOTE — TELEPHONE ENCOUNTER
----- Message from Michael Rasmussen sent at 3/2/2022  2:29 PM CST -----  Type: Needs Medical Advice  Who Called:  Pt    Best Call Back Number: [phone]     Additional Information: Pt sts she is checking on her clearance for her upcoming ortho procedure.  Please advise -- Thank you

## 2022-03-04 ENCOUNTER — TELEPHONE (OUTPATIENT)
Dept: CARDIOLOGY | Facility: CLINIC | Age: 72
End: 2022-03-04
Payer: MEDICARE

## 2022-03-04 NOTE — TELEPHONE ENCOUNTER
----- Message from Nash Eldridge sent at 3/4/2022  2:45 PM CST -----  Needing a call back from nurse, pt is scheduled March 24th for Sx and needing to get an approval please call back pt    [phone]

## 2022-03-23 ENCOUNTER — TELEPHONE (OUTPATIENT)
Dept: CARDIOLOGY | Facility: CLINIC | Age: 72
End: 2022-03-23
Payer: MEDICARE

## 2022-03-23 NOTE — TELEPHONE ENCOUNTER
----- Message from John Go MA sent at 3/23/2022 11:49 AM CDT -----  Contact: [facility]  Type: Needs Medical Advice    Who Called: [facility]   Best Call Back Number:office  [phone]  fax [phone]  Inquiry/Question: STEVE BUSH [[id number]] scheduled for surgery tomorrow needs ekg tracing, echo report Thank you~        The patient is a 26y year old Female complaining of set by MD.

## 2022-05-27 RX ORDER — PRAVASTATIN SODIUM 20 MG/1
20 TABLET ORAL NIGHTLY
Qty: 90 TABLET | Refills: 3 | Status: SHIPPED | OUTPATIENT
Start: 2022-05-27 | End: 2023-05-31

## 2022-08-08 ENCOUNTER — HOSPITAL ENCOUNTER (OUTPATIENT)
Dept: RADIOLOGY | Facility: HOSPITAL | Age: 72
Discharge: HOME | End: 2022-08-08
Attending: ORTHOPAEDIC SURGERY
Payer: MEDICARE

## 2022-08-08 PROCEDURE — 73721 MRI JNT OF LWR EXTRE W/O DYE: CPT | Mod: TC,PO,LT

## 2022-08-22 ENCOUNTER — OFFICE VISIT (OUTPATIENT)
Dept: CARDIOLOGY | Facility: CLINIC | Age: 72
End: 2022-08-22
Payer: MEDICARE

## 2022-08-22 VITALS
RESPIRATION RATE: 16 BRPM | HEART RATE: 89 BPM | BODY MASS INDEX: 28.25 KG/M2 | DIASTOLIC BLOOD PRESSURE: 80 MMHG | WEIGHT: 180 LBS | OXYGEN SATURATION: 98 % | HEIGHT: 67 IN | SYSTOLIC BLOOD PRESSURE: 130 MMHG

## 2022-08-22 DIAGNOSIS — E78.2 MIXED HYPERLIPIDEMIA: ICD-10-CM

## 2022-08-22 DIAGNOSIS — I10 ESSENTIAL HYPERTENSION: Primary | ICD-10-CM

## 2022-08-22 DIAGNOSIS — M17.11 PRIMARY OSTEOARTHRITIS OF RIGHT KNEE: ICD-10-CM

## 2022-08-22 DIAGNOSIS — R94.31 NONSPECIFIC ABNORMAL ELECTROCARDIOGRAM (ECG) (EKG): ICD-10-CM

## 2022-08-22 DIAGNOSIS — E66.9 OBESITY (BMI 30.0-34.9): ICD-10-CM

## 2022-08-22 DIAGNOSIS — R79.89 PRERENAL AZOTEMIA: ICD-10-CM

## 2022-08-22 PROCEDURE — 1125F AMNT PAIN NOTED PAIN PRSNT: CPT | Mod: CPTII,S$GLB,, | Performed by: INTERNAL MEDICINE

## 2022-08-22 PROCEDURE — 3288F FALL RISK ASSESSMENT DOCD: CPT | Mod: CPTII,S$GLB,, | Performed by: INTERNAL MEDICINE

## 2022-08-22 PROCEDURE — 3079F DIAST BP 80-89 MM HG: CPT | Mod: CPTII,S$GLB,, | Performed by: INTERNAL MEDICINE

## 2022-08-22 PROCEDURE — 3288F PR FALLS RISK ASSESSMENT DOCUMENTED: ICD-10-PCS | Mod: CPTII,S$GLB,, | Performed by: INTERNAL MEDICINE

## 2022-08-22 PROCEDURE — 1160F PR REVIEW ALL MEDS BY PRESCRIBER/CLIN PHARMACIST DOCUMENTED: ICD-10-PCS | Mod: CPTII,S$GLB,, | Performed by: INTERNAL MEDICINE

## 2022-08-22 PROCEDURE — 93000 EKG 12-LEAD: ICD-10-PCS | Mod: S$GLB,,, | Performed by: INTERNAL MEDICINE

## 2022-08-22 PROCEDURE — 3079F PR MOST RECENT DIASTOLIC BLOOD PRESSURE 80-89 MM HG: ICD-10-PCS | Mod: CPTII,S$GLB,, | Performed by: INTERNAL MEDICINE

## 2022-08-22 PROCEDURE — 1101F PR PT FALLS ASSESS DOC 0-1 FALLS W/OUT INJ PAST YR: ICD-10-PCS | Mod: CPTII,S$GLB,, | Performed by: INTERNAL MEDICINE

## 2022-08-22 PROCEDURE — 99214 PR OFFICE/OUTPT VISIT, EST, LEVL IV, 30-39 MIN: ICD-10-PCS | Mod: 25,S$GLB,, | Performed by: INTERNAL MEDICINE

## 2022-08-22 PROCEDURE — 3075F SYST BP GE 130 - 139MM HG: CPT | Mod: CPTII,S$GLB,, | Performed by: INTERNAL MEDICINE

## 2022-08-22 PROCEDURE — 1101F PT FALLS ASSESS-DOCD LE1/YR: CPT | Mod: CPTII,S$GLB,, | Performed by: INTERNAL MEDICINE

## 2022-08-22 PROCEDURE — 99214 OFFICE O/P EST MOD 30 MIN: CPT | Mod: 25,S$GLB,, | Performed by: INTERNAL MEDICINE

## 2022-08-22 PROCEDURE — 93000 ELECTROCARDIOGRAM COMPLETE: CPT | Mod: S$GLB,,, | Performed by: INTERNAL MEDICINE

## 2022-08-22 PROCEDURE — 1159F PR MEDICATION LIST DOCUMENTED IN MEDICAL RECORD: ICD-10-PCS | Mod: CPTII,S$GLB,, | Performed by: INTERNAL MEDICINE

## 2022-08-22 PROCEDURE — 3008F BODY MASS INDEX DOCD: CPT | Mod: CPTII,S$GLB,, | Performed by: INTERNAL MEDICINE

## 2022-08-22 PROCEDURE — 1160F RVW MEDS BY RX/DR IN RCRD: CPT | Mod: CPTII,S$GLB,, | Performed by: INTERNAL MEDICINE

## 2022-08-22 PROCEDURE — 1125F PR PAIN SEVERITY QUANTIFIED, PAIN PRESENT: ICD-10-PCS | Mod: CPTII,S$GLB,, | Performed by: INTERNAL MEDICINE

## 2022-08-22 PROCEDURE — 1159F MED LIST DOCD IN RCRD: CPT | Mod: CPTII,S$GLB,, | Performed by: INTERNAL MEDICINE

## 2022-08-22 PROCEDURE — 3075F PR MOST RECENT SYSTOLIC BLOOD PRESS GE 130-139MM HG: ICD-10-PCS | Mod: CPTII,S$GLB,, | Performed by: INTERNAL MEDICINE

## 2022-08-22 PROCEDURE — 3008F PR BODY MASS INDEX (BMI) DOCUMENTED: ICD-10-PCS | Mod: CPTII,S$GLB,, | Performed by: INTERNAL MEDICINE

## 2022-08-22 NOTE — PROGRESS NOTES
Subjective:    Patient ID:  Lorraine Moreno is a 72 y.o. female     Chief Complaint   Patient presents with    Hyperlipidemia    Hypertension       HPI:  Mr Lorraine Moreno is a 72 y.o. female is here for follow-up.    Patient has been doing well no specific complaints at the present time her breathing has been good denies any shortness of breath or difficulty breathing denies any chest pain or tightness or heaviness.    She does have an old ankle injury and she recently had an MRI of the right ankle.  She has been taking all her medications regularly.    Review of patient's allergies indicates:  No Known Allergies    Past Medical History:   Diagnosis Date    Back pain     Bruxism, sleep-related     wears appliance    H/O total hip arthroplasty, right     Hypercholesterolemia     Hypertension     Obesity     Primary osteoarthritis of knees, bilateral     Primary osteoarthritis of right knee 11/2018    Urinary incontinence      Past Surgical History:   Procedure Laterality Date    ANKLE SURGERY Right     EYE SURGERY      HIP ARTHROPLASTY      8/18/15 right JACKELYN /J&J Depuy press fit    JOINT REPLACEMENT Right 12/12/2018    TKA    LASIK Bilateral     TOTAL KNEE ARTHROPLASTY Right 12/12/2018    Procedure: ARTHROPLASTY, KNEE, TOTAL;  Surgeon: Malik Herzog MD;  Location[facility];  Service: Orthopedics;  Laterality: Right;    TUBAL LIGATION       Social History     Tobacco Use    Smoking status: Never Smoker    Smokeless tobacco: Never Used   Substance Use Topics    Alcohol use: Yes     Alcohol/week: 5.0 standard drinks     Types: 5 Glasses of wine per week    Drug use: No     Family History   Problem Relation Age of Onset    No Known Problems Mother     Heart disease Father     Heart attack Father     Early death Father 55        heart attack    No Known Problems Brother     No Known Problems Brother     Psoriasis Neg Hx     Melanoma Neg Hx     Lupus Neg Hx     Eczema Neg Hx         Review of  Systems:   Constitution: Negative for diaphoresis and fever.   HEENT: Negative for nosebleeds.    Cardiovascular: Negative for chest pain       No dyspnea on exertion       No leg swelling        No palpitations  Respiratory: Negative for shortness of breath and wheezing.    Hematologic/Lymphatic: Negative for bleeding problem. Does not bruise/bleed easily.   Skin: Negative for color change and rash.   Musculoskeletal: Negative for falls and myalgias.   Gastrointestinal: Negative for hematemesis and hematochezia.   Genitourinary: Negative for hematuria.   Neurological: Negative for dizziness and light-headedness.   Psychiatric/Behavioral: Negative for altered mental status and memory loss.          Objective:        Vitals:    08/22/22 1407   BP: 130/80   Pulse: 89   Resp: 16       Lab Results   Component Value Date    WBC 4.4 02/11/2022    HGB 11.9 02/11/2022    HCT 37.1 02/11/2022     02/11/2022    CHOL 183 02/11/2022    TRIG 73 02/11/2022    HDL 83 02/11/2022    ALT 10 02/11/2022    AST 15 02/11/2022     02/11/2022    K 4.9 02/11/2022     02/11/2022    CREATININE 1.00 (H) 02/11/2022    BUN 26 (H) 02/11/2022    CO2 27 02/11/2022    TSH 3.83 02/11/2022    INR 1.0 11/29/2018    HGBA1C 5.0 04/13/2021        ECHOCARDIOGRAM RESULTS  Results for orders placed during the hospital encounter of 02/02/22    Echo    Interpretation Summary  · The left ventricle is normal in size with concentric remodeling and normal systolic function.  · The estimated ejection fraction is 57%.  · Normal left ventricular diastolic function.  · Normal right ventricular size with normal right ventricular systolic function.        CURRENT/PREVIOUS VISIT EKG  Results for orders placed or performed in visit on 02/16/22   IN OFFICE EKG 12-LEAD (to [place])    Collection Time: 02/16/22  2:07 PM    Narrative    Test Reason : I10,    Vent. Rate : 080 BPM     Atrial Rate : 080 BPM     P-R Int : 148 ms          QRS Dur : 076 ms      QT  Int : 358 ms       P-R-T Axes : 051 030 067 degrees     QTc Int : 412 ms    Normal sinus rhythm  Normal ECG  When compared with ECG of 12-AUG-2021 14:50,  No significant change was found  Confirmed by Cirilo East MD ([id number]) on 2/17/2022 1:51:17 PM    Referred By:  ARIANA           Confirmed By:Cirilo East MD     No valid procedures specified.   No results found for this or any previous visit.      Physical Exam:  CONSTITUTIONAL: No fever, no chills  HEENT: Normocephalic, atraumatic,pupils reactive to light                 NECK:  No JVD no carotid bruit  CVS: S1S2+, RRR, no murmurs,   LUNGS: Clear  ABDOMEN: Soft, NT, BS+  EXTREMITIES: No cyanosis, edema  : No kevin catheter  NEURO: AAO X 3  PSY: Normal affect      Medication List with Changes/Refills   Current Medications    AMLODIPINE (NORVASC) 5 MG TABLET    Take 1 tablet (5 mg total) by mouth once daily.    CALCIUM CARBONATE (CALCIUM 300 ORAL)    Take 1,200 mg by mouth once daily. pt is staying with daughter left supplements at home she states she will resume them when she returns    CLOTRIMAZOLE-BETAMETHASONE 1-0.05% (LOTRISONE) CREAM    Apply topically as needed.    FLAXSEED 1,000 MG CAP    Take 1 capsule by mouth once daily. pt is staying with daughter left supplements at home she states she will resume them when she returns    HYDROCODONE-ACETAMINOPHEN (NORCO)  MG PER TABLET    Take 1 tablet by mouth every 12 (twelve) hours as needed.    MAGNESIUM ORAL    Take 500 mg by mouth every evening. pt is staying with daughter left supplements at home she states she will resume them when she returns    MULTIVITAMIN (THERAGRAN) PER TABLET    Take 1 tablet by mouth once daily. pt is staying with daughter left supplements at home she states she will resume them when she returns    OXYBUTYNIN (DITROPAN) 5 MG TAB    Take 5 mg by mouth once daily.     PRAVASTATIN (PRAVACHOL) 20 MG TABLET    Take 1 tablet (20 mg total) by mouth every evening.   Discontinued  Medications    HYDROCODONE-ACETAMINOPHEN (NORCO)  MG PER TABLET    Take 1 tablet by mouth every 12 (twelve) hours as needed.    HYDROCODONE-ACETAMINOPHEN (NORCO)  MG PER TABLET    Take 1 tablet by mouth every 12 (twelve) hours as needed for Pain. Greater than 7 day supply medically necessary    HYDROCODONE-ACETAMINOPHEN (NORCO)  MG PER TABLET    Take 1 tablet by mouth every 4 to 6 hours as needed.    ONDANSETRON (ZOFRAN) 4 MG TABLET    Take 2 tablets (8 mg total) by mouth every 6 (six) hours as needed for Nausea.             Assessment:       1. Essential hypertension    2. Mixed hyperlipidemia    3. Obesity (BMI 30.0-34.9)    4. Primary osteoarthritis of right knee    5. Nonspecific abnormal electrocardiogram (ECG) (EKG)    6. Prerenal azotemia         Plan:   1. Essential hypertension  Her blood pressure is well controlled is 130/80 and she is currently on amlodipine 5 mg p.o. q.day.  Continue the same.  2. Mixed hyperlipidemia   Patient is on Pravachol 20 mg p.o. q.h.s. continue the same and a cholesterol has been well controlled on her last blood work her total cholesterol was 183 HDL was 85 and LDL was 84 and triglycerides were 73.  Continue the same management.  Will check her blood work again.  3. Osteoarthritis   Patient has ankle injury and also knee issues she has had surgery in the past for the same.  4. Obesity   Patient has lost some weight and currently she a weighs 180 lb.  Continue low-fat low-cholesterol diet.  Preferably Mediterranean diet.  5. EKG  Reviewed EKG independently patient is in normal sinus rhythm with heart rate of 79 beats per minute no acute ST T-wave changes.  6. Continue current management I will see her back in the office in 6 months time.  7. Pre renal azotemia   Patient to continue to keep herself adequately hydrated.  Her BUN was 26 and creatinine was 1.0.  Discussed with patient to drink more fluids during the daytime.          Problem List Items Addressed  This Visit        Cardiac/Vascular    Essential hypertension - Primary    Hyperlipidemia       Endocrine    Obesity (BMI 30.0-34.9)       Orthopedic    Primary osteoarthritis of right knee      Other Visit Diagnoses     Nonspecific abnormal electrocardiogram (ECG) (EKG)        Prerenal azotemia              No follow-ups on file.

## 2022-08-24 ENCOUNTER — OFFICE VISIT (OUTPATIENT)
Dept: FAMILY MEDICINE | Facility: CLINIC | Age: 72
End: 2022-08-24
Payer: MEDICARE

## 2022-08-24 VITALS
OXYGEN SATURATION: 96 % | DIASTOLIC BLOOD PRESSURE: 78 MMHG | SYSTOLIC BLOOD PRESSURE: 130 MMHG | BODY MASS INDEX: 27.94 KG/M2 | HEIGHT: 67 IN | HEART RATE: 100 BPM | WEIGHT: 178 LBS

## 2022-08-24 DIAGNOSIS — I10 ESSENTIAL HYPERTENSION: Primary | ICD-10-CM

## 2022-08-24 DIAGNOSIS — N32.81 OVERACTIVE BLADDER: ICD-10-CM

## 2022-08-24 DIAGNOSIS — M15.9 PRIMARY OSTEOARTHRITIS INVOLVING MULTIPLE JOINTS: ICD-10-CM

## 2022-08-24 DIAGNOSIS — Z79.899 LONG-TERM USE OF HIGH-RISK MEDICATION: ICD-10-CM

## 2022-08-24 DIAGNOSIS — Z13.89 SCREENING FOR BLOOD OR PROTEIN IN URINE: ICD-10-CM

## 2022-08-24 PROCEDURE — 1160F PR REVIEW ALL MEDS BY PRESCRIBER/CLIN PHARMACIST DOCUMENTED: ICD-10-PCS | Mod: CPTII,S$GLB,, | Performed by: FAMILY MEDICINE

## 2022-08-24 PROCEDURE — 3075F PR MOST RECENT SYSTOLIC BLOOD PRESS GE 130-139MM HG: ICD-10-PCS | Mod: CPTII,S$GLB,, | Performed by: FAMILY MEDICINE

## 2022-08-24 PROCEDURE — 1160F RVW MEDS BY RX/DR IN RCRD: CPT | Mod: CPTII,S$GLB,, | Performed by: FAMILY MEDICINE

## 2022-08-24 PROCEDURE — 3078F DIAST BP <80 MM HG: CPT | Mod: CPTII,S$GLB,, | Performed by: FAMILY MEDICINE

## 2022-08-24 PROCEDURE — 99203 PR OFFICE/OUTPT VISIT, NEW, LEVL III, 30-44 MIN: ICD-10-PCS | Mod: S$GLB,,, | Performed by: FAMILY MEDICINE

## 2022-08-24 PROCEDURE — 3008F BODY MASS INDEX DOCD: CPT | Mod: CPTII,S$GLB,, | Performed by: FAMILY MEDICINE

## 2022-08-24 PROCEDURE — 3078F PR MOST RECENT DIASTOLIC BLOOD PRESSURE < 80 MM HG: ICD-10-PCS | Mod: CPTII,S$GLB,, | Performed by: FAMILY MEDICINE

## 2022-08-24 PROCEDURE — 3075F SYST BP GE 130 - 139MM HG: CPT | Mod: CPTII,S$GLB,, | Performed by: FAMILY MEDICINE

## 2022-08-24 PROCEDURE — 99203 OFFICE O/P NEW LOW 30 MIN: CPT | Mod: S$GLB,,, | Performed by: FAMILY MEDICINE

## 2022-08-24 PROCEDURE — 1159F PR MEDICATION LIST DOCUMENTED IN MEDICAL RECORD: ICD-10-PCS | Mod: CPTII,S$GLB,, | Performed by: FAMILY MEDICINE

## 2022-08-24 PROCEDURE — 3008F PR BODY MASS INDEX (BMI) DOCUMENTED: ICD-10-PCS | Mod: CPTII,S$GLB,, | Performed by: FAMILY MEDICINE

## 2022-08-24 PROCEDURE — 1159F MED LIST DOCD IN RCRD: CPT | Mod: CPTII,S$GLB,, | Performed by: FAMILY MEDICINE

## 2022-08-24 RX ORDER — MULTIVIT WITH MINERALS/HERBS
1 TABLET ORAL DAILY
COMMUNITY

## 2022-08-24 RX ORDER — FERROUS SULFATE 325(65) MG
325 TABLET ORAL
COMMUNITY
End: 2023-06-13

## 2022-08-24 NOTE — PROGRESS NOTES
SUBJECTIVE:    Patient ID: Lorraine Moreno is a 72 y.o. female.    Chief Complaint: Establish Care (Brought bottles, Mammo GYN orders// SW)    Pt here to get established.   Last saw internist on Monday.      Pt with PMHx of HTN, HLD, OAB, arthritis.    BP is doing ok today. Has a BP machine at home, but doesn't take it. Denies CP/SOB/HA. ([place]). To return to [facility] in 6 months.    Takes iron about 3 times a week. Never told she had anemia. Donates blood about every 2 months.     Has bladder accidents some mornings, and has to wear pads in the daytime. Takes oxybutinin which helps. Has tried another med that the insurance that did not help. Gets up 1-2 times a night.     Pt with hx arthritis. Has had right knee and both hips replaced. (NATALIE Herzog)  Having trouble with left ankle. Had MRI of left ankle with on memory of injury. Had right ankle surgery a few years ago. Was taking magnesium for leg cramps from time to time.    Goes to the gym about 3 days a week, and does the elliptical as well.     -----------------------------------------------------  Mammogram 10/2021, nl DEXA ([facility]), Pap  Cscope, Hx polyps ([facility])  Ophtho-Dr. Self      No visits with results within 6 Month(s) from this visit.   Latest known visit with results is:   Orders Only on 02/11/2022   Component Date Value Ref Range Status    Cholesterol 02/11/2022 183  <200 mg/dL Final    HDL 02/11/2022 83  > OR = 50 mg/dL Final    Triglycerides 02/11/2022 73  <150 mg/dL Final    LDL Cholesterol 02/11/2022 84  mg/dL (calc) Final    HDL/Cholesterol Ratio 02/11/2022 2.2  <5.0 (calc) Final    Non HDL Chol. (LDL+VLDL) 02/11/2022 100  <130 mg/dL (calc) Final    Glucose 02/11/2022 88  65 - 99 mg/dL Final    BUN 02/11/2022 26 (A) 7 - 25 mg/dL Final    Creatinine 02/11/2022 1.00 (A) 0.60 - 0.93 mg/dL Final    eGFR if non African American 02/11/2022 57 (A) > OR = 60 mL/min/1.73m2 Final    eGFR if  02/11/2022 66  > OR = 60  mL/min/1.73m2 Final    BUN/Creatinine Ratio 02/11/2022 26 (A) 6 - 22 (calc) Final    Sodium 02/11/2022 140  135 - 146 mmol/L Final    Potassium 02/11/2022 4.9  3.5 - 5.3 mmol/L Final    Chloride 02/11/2022 104  98 - 110 mmol/L Final    CO2 02/11/2022 27  20 - 32 mmol/L Final    Calcium 02/11/2022 9.3  8.6 - 10.4 mg/dL Final    Total Protein 02/11/2022 6.4  6.1 - 8.1 g/dL Final    Albumin 02/11/2022 4.0  3.6 - 5.1 g/dL Final    Globulin, Total 02/11/2022 2.4  1.9 - 3.7 g/dL (calc) Final    Albumin/Globulin Ratio 02/11/2022 1.7  1.0 - 2.5 (calc) Final    Total Bilirubin 02/11/2022 0.4  0.2 - 1.2 mg/dL Final    Alkaline Phosphatase 02/11/2022 63  37 - 153 U/L Final    AST 02/11/2022 15  10 - 35 U/L Final    ALT 02/11/2022 10  6 - 29 U/L Final    WBC 02/11/2022 4.4  3.8 - 10.8 Thousand/uL Final    RBC 02/11/2022 4.00  3.80 - 5.10 Million/uL Final    Hemoglobin 02/11/2022 11.9  11.7 - 15.5 g/dL Final    Hematocrit 02/11/2022 37.1  35.0 - 45.0 % Final    MCV 02/11/2022 92.8  80.0 - 100.0 fL Final    MCH 02/11/2022 29.8  27.0 - 33.0 pg Final    MCHC 02/11/2022 32.1  32.0 - 36.0 g/dL Final    RDW 02/11/2022 12.4  11.0 - 15.0 % Final    Platelets 02/11/2022 284  140 - 400 Thousand/uL Final    MPV 02/11/2022 10.0  7.5 - 12.5 fL Final    Neutrophils, Abs 02/11/2022 2,583  1,500 - 7,800 cells/uL Final    Lymph # 02/11/2022 1,197  850 - 3,900 cells/uL Final    Mono # 02/11/2022 392  200 - 950 cells/uL Final    Eos # 02/11/2022 180  15 - 500 cells/uL Final    Baso # 02/11/2022 48  0 - 200 cells/uL Final    Neutrophils Relative 02/11/2022 58.7  % Final    Lymph % 02/11/2022 27.2  % Final    Mono % 02/11/2022 8.9  % Final    Eosinophil % 02/11/2022 4.1  % Final    Basophil % 02/11/2022 1.1  % Final    T3, Total 02/11/2022 114  76 - 181 ng/dL Final    T4, Free 02/11/2022 1.2  0.8 - 1.8 ng/dL Final    TSH 02/11/2022 3.83  0.40 - 4.50 mIU/L Final       Past Medical History:   Diagnosis Date     Back pain     Bruxism, sleep-related     wears appliance    H/O total hip arthroplasty, right     Hypercholesterolemia     Hypertension     Obesity     Primary osteoarthritis of knees, bilateral     Primary osteoarthritis of right knee 11/2018    Urinary incontinence      Social History     Socioeconomic History    Marital status:    Tobacco Use    Smoking status: Never Smoker    Smokeless tobacco: Never Used   Substance and Sexual Activity    Alcohol use: Yes     Alcohol/week: 5.0 standard drinks     Types: 5 Glasses of wine per week    Drug use: No    Sexual activity: Not Currently     Past Surgical History:   Procedure Laterality Date    ANKLE SURGERY Right     EYE SURGERY      HIP ARTHROPLASTY      8/18/15 right JACKELYN /J&J Depuy press fit    HIP SURGERY Left 03/2022    JOINT REPLACEMENT Right 12/12/2018    TKA    LASIK Bilateral     TOTAL KNEE ARTHROPLASTY Right 12/12/2018    Procedure: ARTHROPLASTY, KNEE, TOTAL;  Surgeon: Malik Herzog MD;  Location: [facility];  Service: Orthopedics;  Laterality: Right;    TUBAL LIGATION       Family History   Problem Relation Age of Onset    No Known Problems Mother     Heart disease Father     Heart attack Father     Early death Father 55        heart attack    No Known Problems Brother     No Known Problems Brother     Psoriasis Neg Hx     Melanoma Neg Hx     Lupus Neg Hx     Eczema Neg Hx        Review of patient's allergies indicates:  No Known Allergies    Current Outpatient Medications:     amLODIPine (NORVASC) 5 MG tablet, Take 1 tablet (5 mg total) by mouth once daily., Disp: 90 tablet, Rfl: 3    b complex vitamins tablet, Take 1 tablet by mouth once daily., Disp: , Rfl:     CALCIUM CARBONATE (CALCIUM 300 ORAL), Take 1,200 mg by mouth once daily. pt is staying with daughter left supplements at home she states she will resume them when she returns, Disp: , Rfl:     clotrimazole-betamethasone 1-0.05% (LOTRISONE) cream, Apply  topically as needed., Disp: , Rfl:     ferrous sulfate (IRON) 325 mg (65 mg iron) Tab tablet, Take 325 mg by mouth daily with breakfast., Disp: , Rfl:     flaxseed 1,000 mg Cap, Take 1 capsule by mouth once daily. pt is staying with daughter left supplements at home she states she will resume them when she returns, Disp: , Rfl:     HYDROcodone-acetaminophen (NORCO)  mg per tablet, Take 1 tablet by mouth every 12 (twelve) hours as needed., Disp: 40 tablet, Rfl: 0    MAGNESIUM ORAL, Take 500 mg by mouth every evening. pt is staying with daughter left supplements at home she states she will resume them when she returns, Disp: , Rfl:     multivitamin (THERAGRAN) per tablet, Take 1 tablet by mouth once daily. pt is staying with daughter left supplements at home she states she will resume them when she returns, Disp: , Rfl:     oxybutynin (DITROPAN) 5 MG Tab, Take 5 mg by mouth once daily. , Disp: , Rfl: 12    pravastatin (PRAVACHOL) 20 MG tablet, Take 1 tablet (20 mg total) by mouth every evening., Disp: 90 tablet, Rfl: 3    Review of Systems   Constitutional: Negative for appetite change, fatigue, fever and unexpected weight change.   Respiratory: Negative for cough, chest tightness, shortness of breath and wheezing.    Cardiovascular: Negative for chest pain and leg swelling.   Gastrointestinal: Negative for abdominal pain, constipation, nausea and vomiting.        -heartburn   Genitourinary: Negative for difficulty urinating, dysuria, frequency and urgency.   Musculoskeletal: Negative for arthralgias, back pain, myalgias and neck pain.   Skin: Negative for rash.   Neurological: Negative for dizziness, weakness, numbness and headaches.   Hematological: Does not bruise/bleed easily.   Psychiatric/Behavioral: Negative for dysphoric mood, sleep disturbance and suicidal ideas. The patient is not nervous/anxious.    All other systems reviewed and are negative.         Objective:      Vitals:    08/24/22 1422  "  BP: 130/78   Pulse: 100   SpO2: 96%   Weight: 80.7 kg (178 lb)   Height: 5' 7" (1.702 m)     Physical Exam  Vitals reviewed.   Constitutional:       General: She is not in acute distress.     Appearance: She is well-developed.   HENT:      Head: Normocephalic and atraumatic.   Neck:      Thyroid: No thyromegaly.      Vascular: No carotid bruit.   Cardiovascular:      Rate and Rhythm: Normal rate and regular rhythm.      Heart sounds: Normal heart sounds. No murmur heard.    No friction rub.   Pulmonary:      Effort: Pulmonary effort is normal.      Breath sounds: Normal breath sounds. No wheezing or rales.   Abdominal:      General: Bowel sounds are normal. There is no distension.      Palpations: Abdomen is soft.      Tenderness: There is no abdominal tenderness.   Musculoskeletal:      Cervical back: Neck supple.   Lymphadenopathy:      Cervical: No cervical adenopathy.   Skin:     General: Skin is warm and dry.      Findings: No rash.   Neurological:      Mental Status: She is alert and oriented to person, place, and time.   Psychiatric:         Attention and Perception: She is attentive.         Speech: Speech normal.         Behavior: Behavior normal.         Thought Content: Thought content normal.         Judgment: Judgment normal.           Assessment:       1. Essential hypertension    2. Primary osteoarthritis involving multiple joints    3. Overactive bladder    4. Long-term use of high-risk medication    5. Screening for blood or protein in urine         Plan:       Essential hypertension  Comments:  Controlled. Will continue to monitor BP on current medication regimen  Orders:  -     Microalbumin/Creatinine Ratio, Urine; Future; Expected date: 08/24/2022    Primary osteoarthritis involving multiple joints  Comments:  Pain controlled. Will continue to monitor pain control and function    Overactive bladder  Comments:  Stable. To continue oxybutinin    Long-term use of high-risk medication  -     " Urinalysis, Reflex to Urine Culture Urine, Clean Catch; Future; Expected date: 08/24/2022  -     CBC Auto Differential; Future; Expected date: 08/24/2022    Screening for blood or protein in urine  -     Urinalysis, Reflex to Urine Culture Urine, Clean Catch; Future; Expected date: 08/24/2022  -     Microalbumin/Creatinine Ratio, Urine; Future; Expected date: 08/24/2022    Labs and/or tests have been ordered for the evaluation/monitoring of acute/chronic conditions, to be done just before next visit.    Follow up in about 3 months (around 11/24/2022) for HTN, Arthritis, OAB, LABS.        8/24/2022 Parth Dalton

## 2022-11-23 ENCOUNTER — TELEPHONE (OUTPATIENT)
Dept: FAMILY MEDICINE | Facility: CLINIC | Age: 72
End: 2022-11-23

## 2023-02-08 ENCOUNTER — HOSPITAL ENCOUNTER (OUTPATIENT)
Facility: HOSPITAL | Age: 73
Discharge: HOME | End: 2023-02-09
Attending: EMERGENCY MEDICINE | Admitting: STUDENT IN AN ORGANIZED HEALTH CARE EDUCATION/TRAINING PROGRAM
Payer: MEDICARE

## 2023-02-08 ENCOUNTER — TELEPHONE (OUTPATIENT)
Dept: CARDIOLOGY | Facility: CLINIC | Age: 73
End: 2023-02-08
Payer: MEDICARE

## 2023-02-08 DIAGNOSIS — R07.9 CHEST PAIN: Primary | ICD-10-CM

## 2023-02-08 DIAGNOSIS — R07.89 ATYPICAL CHEST PAIN: ICD-10-CM

## 2023-02-08 LAB
ALBUMIN SERPL BCP-MCNC: 4.2 G/DL (ref 3.5–5.2)
ALP SERPL-CCNC: 68 U/L (ref 55–135)
ALT SERPL W/O P-5'-P-CCNC: 13 U/L (ref 10–44)
ANION GAP SERPL CALC-SCNC: 10 MMOL/L (ref 8–16)
AST SERPL-CCNC: 20 U/L (ref 10–40)
BASOPHILS # BLD AUTO: 0.05 K/UL (ref 0–0.2)
BASOPHILS NFR BLD: 1.1 % (ref 0–1.9)
BILIRUB SERPL-MCNC: 0.6 MG/DL (ref 0.1–1)
BILIRUB UR QL STRIP: NEGATIVE
BNP SERPL-MCNC: 36 PG/ML (ref 0–99)
BUN SERPL-MCNC: 20 MG/DL (ref 8–23)
CALCIUM SERPL-MCNC: 9.3 MG/DL (ref 8.7–10.5)
CHLORIDE SERPL-SCNC: 105 MMOL/L (ref 95–110)
CK SERPL-CCNC: 108 U/L (ref 20–180)
CLARITY UR: CLEAR
CO2 SERPL-SCNC: 24 MMOL/L (ref 23–29)
COLOR UR: YELLOW
CREAT SERPL-MCNC: 1 MG/DL (ref 0.5–1.4)
D DIMER PPP IA.FEU-MCNC: 0.36 MG/L FEU
DIFFERENTIAL METHOD: NORMAL
EOSINOPHIL # BLD AUTO: 0.1 K/UL (ref 0–0.5)
EOSINOPHIL NFR BLD: 1.7 % (ref 0–8)
ERYTHROCYTE [DISTWIDTH] IN BLOOD BY AUTOMATED COUNT: 13.6 % (ref 11.5–14.5)
EST. GFR  (NO RACE VARIABLE): >60 ML/MIN/1.73 M^2
GLUCOSE SERPL-MCNC: 88 MG/DL (ref 70–110)
GLUCOSE UR QL STRIP: NEGATIVE
HCT VFR BLD AUTO: 37.9 % (ref 37–48.5)
HGB BLD-MCNC: 12.4 G/DL (ref 12–16)
HGB UR QL STRIP: NEGATIVE
IMM GRANULOCYTES # BLD AUTO: 0 K/UL (ref 0–0.04)
IMM GRANULOCYTES NFR BLD AUTO: 0 % (ref 0–0.5)
INR PPP: 1 (ref 0.8–1.2)
KETONES UR QL STRIP: NEGATIVE
LEUKOCYTE ESTERASE UR QL STRIP: ABNORMAL
LIPASE SERPL-CCNC: 33 U/L (ref 4–60)
LYMPHOCYTES # BLD AUTO: 1.1 K/UL (ref 1–4.8)
LYMPHOCYTES NFR BLD: 23.7 % (ref 18–48)
MAGNESIUM SERPL-MCNC: 1.7 MG/DL (ref 1.6–2.6)
MCH RBC QN AUTO: 30.5 PG (ref 27–31)
MCHC RBC AUTO-ENTMCNC: 32.7 G/DL (ref 32–36)
MCV RBC AUTO: 93 FL (ref 82–98)
MICROSCOPIC COMMENT: NORMAL
MONOCYTES # BLD AUTO: 0.4 K/UL (ref 0.3–1)
MONOCYTES NFR BLD: 8.5 % (ref 4–15)
NEUTROPHILS # BLD AUTO: 3 K/UL (ref 1.8–7.7)
NEUTROPHILS NFR BLD: 65 % (ref 38–73)
NITRITE UR QL STRIP: NEGATIVE
NRBC BLD-RTO: 0 /100 WBC
PH UR STRIP: 8 [PH] (ref 5–8)
PLATELET # BLD AUTO: 240 K/UL (ref 150–450)
PMV BLD AUTO: 9.4 FL (ref 9.2–12.9)
POTASSIUM SERPL-SCNC: 4.1 MMOL/L (ref 3.5–5.1)
PROT SERPL-MCNC: 6.8 G/DL (ref 6–8.4)
PROT UR QL STRIP: NEGATIVE
PROTHROMBIN TIME: 10.4 SEC (ref 9–12.5)
RBC # BLD AUTO: 4.07 M/UL (ref 4–5.4)
SARS-COV-2 RDRP RESP QL NAA+PROBE: NEGATIVE
SODIUM SERPL-SCNC: 139 MMOL/L (ref 136–145)
SP GR UR STRIP: 1.01 (ref 1–1.03)
TROPONIN I SERPL HS-MCNC: 2.9 PG/ML (ref 0–14.9)
TROPONIN I SERPL HS-MCNC: 3 PG/ML (ref 0–14.9)
TROPONIN I SERPL HS-MCNC: 3.7 PG/ML (ref 0–14.9)
TSH SERPL DL<=0.005 MIU/L-ACNC: 2.48 UIU/ML (ref 0.34–5.6)
URN SPEC COLLECT METH UR: ABNORMAL
UROBILINOGEN UR STRIP-ACNC: NEGATIVE EU/DL
WBC # BLD AUTO: 4.6 K/UL (ref 3.9–12.7)
WBC #/AREA URNS HPF: 5 /HPF (ref 0–5)

## 2023-02-08 PROCEDURE — 93005 ELECTROCARDIOGRAM TRACING: CPT | Performed by: SPECIALIST

## 2023-02-08 PROCEDURE — 93010 EKG 12-LEAD: ICD-10-PCS | Mod: ,,, | Performed by: SPECIALIST

## 2023-02-08 PROCEDURE — 84484 ASSAY OF TROPONIN QUANT: CPT | Mod: 91 | Performed by: STUDENT IN AN ORGANIZED HEALTH CARE EDUCATION/TRAINING PROGRAM

## 2023-02-08 PROCEDURE — 80053 COMPREHEN METABOLIC PANEL: CPT | Performed by: PHYSICIAN ASSISTANT

## 2023-02-08 PROCEDURE — 25000003 PHARM REV CODE 250: Performed by: STUDENT IN AN ORGANIZED HEALTH CARE EDUCATION/TRAINING PROGRAM

## 2023-02-08 PROCEDURE — 82550 ASSAY OF CK (CPK): CPT | Performed by: PHYSICIAN ASSISTANT

## 2023-02-08 PROCEDURE — 85610 PROTHROMBIN TIME: CPT | Performed by: PHYSICIAN ASSISTANT

## 2023-02-08 PROCEDURE — 83735 ASSAY OF MAGNESIUM: CPT | Performed by: PHYSICIAN ASSISTANT

## 2023-02-08 PROCEDURE — 99285 EMERGENCY DEPT VISIT HI MDM: CPT | Mod: 25

## 2023-02-08 PROCEDURE — 84443 ASSAY THYROID STIM HORMONE: CPT | Performed by: PHYSICIAN ASSISTANT

## 2023-02-08 PROCEDURE — 81001 URINALYSIS AUTO W/SCOPE: CPT | Performed by: EMERGENCY MEDICINE

## 2023-02-08 PROCEDURE — G0378 HOSPITAL OBSERVATION PER HR: HCPCS

## 2023-02-08 PROCEDURE — 85379 FIBRIN DEGRADATION QUANT: CPT | Performed by: EMERGENCY MEDICINE

## 2023-02-08 PROCEDURE — U0002 COVID-19 LAB TEST NON-CDC: HCPCS | Performed by: EMERGENCY MEDICINE

## 2023-02-08 PROCEDURE — 84484 ASSAY OF TROPONIN QUANT: CPT | Mod: 91 | Performed by: PHYSICIAN ASSISTANT

## 2023-02-08 PROCEDURE — 85025 COMPLETE CBC W/AUTO DIFF WBC: CPT | Performed by: PHYSICIAN ASSISTANT

## 2023-02-08 PROCEDURE — 83690 ASSAY OF LIPASE: CPT | Performed by: PHYSICIAN ASSISTANT

## 2023-02-08 PROCEDURE — 25000003 PHARM REV CODE 250: Performed by: EMERGENCY MEDICINE

## 2023-02-08 PROCEDURE — 83880 ASSAY OF NATRIURETIC PEPTIDE: CPT | Performed by: PHYSICIAN ASSISTANT

## 2023-02-08 PROCEDURE — 93010 ELECTROCARDIOGRAM REPORT: CPT | Mod: ,,, | Performed by: SPECIALIST

## 2023-02-08 PROCEDURE — 84484 ASSAY OF TROPONIN QUANT: CPT | Performed by: PHYSICIAN ASSISTANT

## 2023-02-08 RX ORDER — TALC
6 POWDER (GRAM) TOPICAL NIGHTLY PRN
Status: DISCONTINUED | OUTPATIENT
Start: 2023-02-08 | End: 2023-02-09 | Stop reason: HOSPADM

## 2023-02-08 RX ORDER — ENOXAPARIN SODIUM 100 MG/ML
40 INJECTION SUBCUTANEOUS EVERY 24 HOURS
Status: DISCONTINUED | OUTPATIENT
Start: 2023-02-09 | End: 2023-02-09 | Stop reason: HOSPADM

## 2023-02-08 RX ORDER — OXYBUTYNIN CHLORIDE 5 MG/1
5 TABLET ORAL DAILY
Status: DISCONTINUED | OUTPATIENT
Start: 2023-02-09 | End: 2023-02-09 | Stop reason: HOSPADM

## 2023-02-08 RX ORDER — ACETAMINOPHEN 325 MG/1
650 TABLET ORAL EVERY 8 HOURS PRN
Status: DISCONTINUED | OUTPATIENT
Start: 2023-02-08 | End: 2023-02-09 | Stop reason: HOSPADM

## 2023-02-08 RX ORDER — ASPIRIN 325 MG
325 TABLET ORAL
Status: COMPLETED | OUTPATIENT
Start: 2023-02-08 | End: 2023-02-08

## 2023-02-08 RX ORDER — ASPIRIN 81 MG/1
81 TABLET ORAL DAILY
Status: DISCONTINUED | OUTPATIENT
Start: 2023-02-09 | End: 2023-02-09 | Stop reason: HOSPADM

## 2023-02-08 RX ORDER — PRAVASTATIN SODIUM 20 MG/1
20 TABLET ORAL NIGHTLY
Status: DISCONTINUED | OUTPATIENT
Start: 2023-02-08 | End: 2023-02-09 | Stop reason: HOSPADM

## 2023-02-08 RX ORDER — NITROGLYCERIN 0.4 MG/1
0.4 TABLET SUBLINGUAL EVERY 5 MIN PRN
Status: DISCONTINUED | OUTPATIENT
Start: 2023-02-08 | End: 2023-02-09 | Stop reason: HOSPADM

## 2023-02-08 RX ORDER — SODIUM CHLORIDE 0.9 % (FLUSH) 0.9 %
3 SYRINGE (ML) INJECTION EVERY 6 HOURS PRN
Status: DISCONTINUED | OUTPATIENT
Start: 2023-02-08 | End: 2023-02-09 | Stop reason: HOSPADM

## 2023-02-08 RX ORDER — AMLODIPINE BESYLATE 5 MG/1
5 TABLET ORAL DAILY
Status: DISCONTINUED | OUTPATIENT
Start: 2023-02-09 | End: 2023-02-09 | Stop reason: HOSPADM

## 2023-02-08 RX ADMIN — PRAVASTATIN SODIUM 20 MG: 10 TABLET ORAL at 08:02

## 2023-02-08 RX ADMIN — ACETAMINOPHEN 650 MG: 325 TABLET ORAL at 11:02

## 2023-02-08 RX ADMIN — ASPIRIN 325 MG ORAL TABLET 325 MG: 325 PILL ORAL at 03:02

## 2023-02-08 NOTE — ED PROVIDER NOTES
Encounter Date: 2/8/2023       History     Chief Complaint   Patient presents with    Chest Pain     HEAVINESS ONSET 1 AM    Shoulder Pain     RT     72-year-old female with history of hypertension, hyperlipidemia, obesity, hyperlipidemia, osteoarthritis.  Patient presents emergency department with complaint of right shoulder midsternal chest pain with associated mild nausea and shortness of breath which occurred at around 2:00 a.m. this morning.  Patient states pain woke her from sleep.  Patient had intermittent pain since then last pain known around 10:30 -11:00 a.m. today.  Patient does have family history coronary artery disease.  She denied abdominal pain, no back pain, no recent illness, no weakness or dizziness.    Review of patient's allergies indicates:  No Known Allergies  Past Medical History:   Diagnosis Date    Back pain     Bruxism, sleep-related     wears appliance    H/O total hip arthroplasty, right     Hypercholesterolemia     Hypertension     Obesity     Primary osteoarthritis of knees, bilateral     Primary osteoarthritis of right knee 11/2018    Urinary incontinence      Past Surgical History:   Procedure Laterality Date    ANKLE SURGERY Right     EYE SURGERY      HIP ARTHROPLASTY      8/18/15 right JACKELYN /J&J Depuy press fit    HIP SURGERY Left 03/2022    JOINT REPLACEMENT Right 12/12/2018    TKA    LASIK Bilateral     TOTAL KNEE ARTHROPLASTY Right 12/12/2018    Procedure: ARTHROPLASTY, KNEE, TOTAL;  Surgeon: Malik Herzog MD;  Location: [facility];  Service: Orthopedics;  Laterality: Right;    TUBAL LIGATION       Family History   Problem Relation Age of Onset    No Known Problems Mother     Heart disease Father     Heart attack Father     Early death Father 55        heart attack    No Known Problems Brother     No Known Problems Brother     Psoriasis Neg Hx     Melanoma Neg Hx     Lupus Neg Hx     Eczema Neg Hx      Social History     Tobacco Use    Smoking status: Never    Smokeless tobacco:  Never   Substance Use Topics    Alcohol use: Yes     Alcohol/week: 5.0 standard drinks     Types: 5 Glasses of wine per week    Drug use: No     Review of Systems   Constitutional:  Negative for fever.   HENT:  Negative for sore throat.    Respiratory:  Negative for shortness of breath.    Cardiovascular:  Positive for chest pain.   Gastrointestinal:  Positive for nausea. Negative for vomiting.   Genitourinary:  Negative for dysuria.   Musculoskeletal:  Negative for arthralgias, back pain and myalgias.   Skin:  Negative for rash.   Neurological:  Negative for weakness.   Hematological:  Does not bruise/bleed easily.     Physical Exam     Initial Vitals [02/08/23 1033]   BP Pulse Resp Temp SpO2   (!) 152/87 85 16 -- 96 %      MAP       --         Physical Exam    Nursing note and vitals reviewed.  Constitutional: She appears well-developed and well-nourished.   HENT:   Head: Normocephalic and atraumatic.   Nose: Nose normal.   Mouth/Throat: Oropharynx is clear and moist.   Eyes: Conjunctivae and EOM are normal. Pupils are equal, round, and reactive to light.   Neck: Neck supple. No thyromegaly present. No tracheal deviation present.   Normal range of motion.  Cardiovascular:  Normal rate, regular rhythm, normal heart sounds and intact distal pulses.     Exam reveals no gallop and no friction rub.       No murmur heard.  Pulmonary/Chest: No stridor. No respiratory distress.   Course bilateral breath sounds no adventitious sounds   Abdominal: Abdomen is soft. Bowel sounds are normal. She exhibits no mass. There is no rebound and no guarding.   Musculoskeletal:         General: No edema. Normal range of motion.      Cervical back: Normal range of motion and neck supple.     Lymphadenopathy:     She has no cervical adenopathy.   Neurological: She is alert and oriented to person, place, and time. She has normal strength and normal reflexes. GCS score is 15. GCS eye subscore is 4. GCS verbal subscore is 5. GCS motor  subscore is 6.   Skin: Skin is warm and dry. Capillary refill takes less than 2 seconds.   Psychiatric: She has a normal mood and affect.       ED Course   Procedures  Labs Reviewed   URINALYSIS, REFLEX TO URINE CULTURE - Abnormal; Notable for the following components:       Result Value    Leukocytes, UA 3+ (*)     All other components within normal limits    Narrative:     Specimen Source->Urine   CBC W/ AUTO DIFFERENTIAL   COMPREHENSIVE METABOLIC PANEL   B-TYPE NATRIURETIC PEPTIDE   TROPONIN I HIGH SENSITIVITY   PROTIME-INR   TROPONIN I HIGH SENSITIVITY   LIPASE   CK   MAGNESIUM   TSH   D DIMER, QUANTITATIVE   D DIMER, QUANTITATIVE   LIPASE   CK   MAGNESIUM   TSH   SARS-COV-2 RNA AMPLIFICATION, QUAL   URINALYSIS MICROSCOPIC    Narrative:     Specimen Source->Urine     EKG Readings: (Independently Interpreted)   Initial Reading: No STEMI. Rhythm: Normal Sinus Rhythm. Heart Rate: 79. Ectopy: No Ectopy. Axis: Normal.   Normal sinus rhythm, 79, normal axis, nonspecific ST segment changes.   ECG Results              EKG 12-lead (In process)  Result time 02/08/23 11:05:12      In process by Interface, Lab In [facility] (02/08/23 11:05:12)                   Narrative:    Test Reason : R07.9,    Vent. Rate : 079 BPM     Atrial Rate : 079 BPM     P-R Int : 160 ms          QRS Dur : 072 ms      QT Int : 368 ms       P-R-T Axes : 035 022 053 degrees     QTc Int : 421 ms    Normal sinus rhythm  Normal ECG  When compared with ECG of 22-AUG-2022 14:16,  No significant change was found    Referred By: [email or web address]   SELF           Confirmed By:                                   Imaging Results              X-Ray Chest AP Portable (Final result)  Result time 02/08/23 11:42:10      Final result by Monty Diaz MD (02/08/23 11:42:10)                   Narrative:    Chest single view    Clinical data:Chest pain    FINDINGS: AP view of the chest demonstrates the heart to be upper normal in size. The thoracic aorta is mildly  elongated. The lungs are clear. There is mild elevation of left hemidiaphragm, unchanged when compared to 2018.    Osteoarthritic changes are noted at both shoulders.    IMPRESSION:  1. Chronic findings as above. No acute radiographic abnormalities.    Electronically signed by:  Monty Diaz MD  2/8/2023 11:42 AM CST Workstation: [id number]                                     Medications   aspirin tablet 325 mg (has no administration in time range)     Medical Decision Making:   Initial Assessment:   72-year-old female with history of hypertension, hyperlipidemia, obesity, hyperlipidemia, osteoarthritis.  Patient presents emergency department with complaint of right shoulder midsternal chest pain with associated mild nausea and shortness of breath which occurred at around 2:00 a.m. this morning.  Patient states pain woke her from sleep.  Patient had intermittent pain since then last pain known around 10:30 -11:00 a.m. today.  Patient does have family history coronary artery disease.  She denied abdominal pain, no back pain, no recent illness, no weakness or dizziness.    Differential Diagnosis:   ACS, unstable angina, dyspepsia, musculoskeletal chest pain  Clinical Tests:   Lab Tests: Ordered and Reviewed  Radiological Study: Ordered and Reviewed  Medical Tests: Ordered and Reviewed  ED Management:  Patient seen evaluated emergency department.  Patient presented with midsternal chest pain.  Evaluated for high-risk stress pain.  Patient found with heart score 6, JANE score 4.  At this time patient with nonspecific T-wave changes on EKG, normal cardiac markers.  Was given aspirin emergency department.  Patient will be admitted to Hospital Medicine for cardiac evaluation and stress testing.  Remained hemodynamically adequate awaiting admission to Hospital Medicine.  Additional MDM:   Heart Score:    History:          Moderately suspicious.  ECG:             Nonspecific repolarisation disturbance  Age:                >65 years  Risk factors: >= 3 risk factors or history of atherosclerotic disease  Troponin:       Less than or equal to normal limit  Final Score: 6      JANE Score:   Age over 65:                                    1.00   > or = to 3 CAD risk factors:           1.00  Established CAD:                            0.00  > or = to 2 anginal events in the past 24 hours: 1.00  Use of ASA in past 7 days:              1.00  Elevated Enzymes:                         0.00  ST Depression > or = to 0.05 mV:  0.00  JANE score: 4                     Clinical Impression:   Final diagnoses:  [R07.9] Chest pain               Сергей Lewis MD  02/08/23 153

## 2023-02-08 NOTE — TELEPHONE ENCOUNTER
----- Message from Bry May sent at 2/8/2023  8:35 AM CST -----  Contact: pt at [phone]  Type:  Same Day Appointment Request    Caller is requesting a same day appointment.  Caller declined first available appointment listed below.      Name of Caller:  pt  When is the first available appointment?  4/11  Symptoms:  check up, heaviness in chest  Best Call Back Number:  [phone]  Additional Information:  pt is calling the office to schedule an appt for today for a check up, heaviness in chest and the date of 4/11 she states she would like to be worked in to be seen today. Please call back and advise.

## 2023-02-08 NOTE — ED NOTES
Pt states she woke up around 1 am with pain in right shoulder that radiated to right side of chest. States its never happened before she was just concerned and wanted it checked out. Denies sob and no other pain

## 2023-02-08 NOTE — ED NOTES
"Medical screening exam completed.  I have conducted a focused provider triage encounter, findings are as follows:    Brief history of present illness:  Patient with chest pain since 1:30 this morning, 3/10 "weight" on chest, no SOB or radiating pain.  No N/V, fever, cough.  No current chest pain.  She took ASA 81 mg x 2 tablets this morning.     Vitals:    02/08/23 1033   BP: (!) 152/87   BP Location: Left arm   Patient Position: Sitting   Pulse: 85   Resp: 16   TempSrc: Oral   SpO2: 96%   Weight: 81.6 kg (180 lb)   Height: 5' 7" (1.702 m)       Pertinent physical exam:  no respiratory distress.     Brief workup plan:  cardiac workup, chest xray.    Preliminary workup initiated; this workup will be continued and followed by the physician or advanced practice provider that is assigned to the patient when roomed.       ARIANA Tirado  02/08/23 1123    "

## 2023-02-09 ENCOUNTER — CLINICAL SUPPORT (OUTPATIENT)
Dept: CARDIOLOGY | Facility: HOSPITAL | Age: 73
End: 2023-02-09
Attending: STUDENT IN AN ORGANIZED HEALTH CARE EDUCATION/TRAINING PROGRAM
Payer: MEDICARE

## 2023-02-09 ENCOUNTER — HOSPITAL ENCOUNTER (OUTPATIENT)
Dept: RADIOLOGY | Facility: HOSPITAL | Age: 73
Discharge: HOME | End: 2023-02-09
Attending: STUDENT IN AN ORGANIZED HEALTH CARE EDUCATION/TRAINING PROGRAM
Payer: MEDICARE

## 2023-02-09 VITALS
SYSTOLIC BLOOD PRESSURE: 164 MMHG | RESPIRATION RATE: 17 BRPM | WEIGHT: 178.81 LBS | OXYGEN SATURATION: 98 % | HEART RATE: 58 BPM | HEIGHT: 67 IN | TEMPERATURE: 98 F | BODY MASS INDEX: 28.07 KG/M2 | DIASTOLIC BLOOD PRESSURE: 91 MMHG

## 2023-02-09 VITALS — BODY MASS INDEX: 27.94 KG/M2 | HEIGHT: 67 IN | WEIGHT: 178 LBS

## 2023-02-09 PROBLEM — R07.89 ATYPICAL CHEST PAIN: Status: ACTIVE | Noted: 2023-02-09

## 2023-02-09 PROBLEM — R07.9 CHEST PAIN: Status: ACTIVE | Noted: 2023-02-09

## 2023-02-09 LAB
AV INDEX (PROSTH): 0.67
AV MEAN GRADIENT: 5 MMHG
AV PEAK GRADIENT: 9 MMHG
AV VALVE AREA: 1.7 CM2
AV VELOCITY RATIO: 0.65
BSA FOR ECHO PROCEDURE: 1.95 M2
CV ECHO LV RWT: 0.55 CM
CV PHARM DOSE: 0.4 MG
CV STRESS BASE HR: 62 BPM
DIASTOLIC BLOOD PRESSURE: 96 MMHG
DOP CALC AO PEAK VEL: 1.49 M/S
DOP CALC AO VTI: 35 CM
DOP CALC LVOT AREA: 2.5 CM2
DOP CALC LVOT DIAMETER: 1.8 CM
DOP CALC LVOT PEAK VEL: 0.97 M/S
DOP CALC LVOT STROKE VOLUME: 59.52 CM3
DOP CALC MV VTI: 30.5 CM
DOP CALCLVOT PEAK VEL VTI: 23.4 CM
E WAVE DECELERATION TIME: 254 MSEC
E/A RATIO: 0.66
E/E' RATIO: 10.17 M/S
ECHO LV POSTERIOR WALL: 1.12 CM (ref 0.6–1.1)
EJECTION FRACTION: 55 %
FRACTIONAL SHORTENING: 10 % (ref 28–44)
INTERVENTRICULAR SEPTUM: 1.09 CM (ref 0.6–1.1)
LEFT ATRIUM SIZE: 2.9 CM
LEFT ATRIUM VOLUME INDEX MOD: 23 ML/M2
LEFT ATRIUM VOLUME MOD: 44.1 CM3
LEFT INTERNAL DIMENSION IN SYSTOLE: 3.67 CM (ref 2.1–4)
LEFT VENTRICLE DIASTOLIC VOLUME INDEX: 38.66 ML/M2
LEFT VENTRICLE DIASTOLIC VOLUME: 74.22 ML
LEFT VENTRICLE MASS INDEX: 79 G/M2
LEFT VENTRICLE SYSTOLIC VOLUME INDEX: 19.1 ML/M2
LEFT VENTRICLE SYSTOLIC VOLUME: 36.73 ML
LEFT VENTRICULAR INTERNAL DIMENSION IN DIASTOLE: 4.1 CM (ref 3.5–6)
LEFT VENTRICULAR MASS: 152.29 G
LV LATERAL E/E' RATIO: 10.17 M/S
LV SEPTAL E/E' RATIO: 10.17 M/S
LVOT MG: 2 MMHG
LVOT MV: 0.62 CM/S
MV MEAN GRADIENT: 2 MMHG
MV PEAK A VEL: 0.93 M/S
MV PEAK E VEL: 0.61 M/S
MV PEAK GRADIENT: 4 MMHG
MV STENOSIS PRESSURE HALF TIME: 135 MS
MV VALVE AREA BY CONTINUITY EQUATION: 1.95 CM2
MV VALVE AREA P 1/2 METHOD: 1.63 CM2
OHS CV CPX 1 MINUTE RECOVERY HEART RATE: 105 BPM
OHS CV CPX 85 PERCENT MAX PREDICTED HEART RATE MALE: 122
OHS CV CPX MAX PREDICTED HEART RATE: 144
OHS CV CPX PATIENT IS FEMALE: 1
OHS CV CPX PATIENT IS MALE: 0
OHS CV CPX PEAK DIASTOLIC BLOOD PRESSURE: 104 MMHG
OHS CV CPX PEAK HEAR RATE: 105 BPM
OHS CV CPX PEAK RATE PRESSURE PRODUCT: NORMAL
OHS CV CPX PEAK SYSTOLIC BLOOD PRESSURE: 162 MMHG
OHS CV CPX PERCENT MAX PREDICTED HEART RATE ACHIEVED: 73
OHS CV CPX RATE PRESSURE PRODUCT PRESENTING: 8990
PISA TR MAX VEL: 2.12 M/S
PV MV: 0.53 M/S
PV PEAK VELOCITY: 0.77 CM/S
RV TISSUE DOPPLER FREE WALL SYSTOLIC VELOCITY 1 (APICAL 4 CHAMBER VIEW): 0.01 CM/S
SYSTOLIC BLOOD PRESSURE: 145 MMHG
TDI LATERAL: 0.06 M/S
TDI SEPTAL: 0.06 M/S
TDI: 0.06 M/S
TR MAX PG: 18 MMHG
TRICUSPID ANNULAR PLANE SYSTOLIC EXCURSION: 1.94 CM

## 2023-02-09 PROCEDURE — 93306 TTE W/DOPPLER COMPLETE: CPT

## 2023-02-09 PROCEDURE — 93018 CV STRESS TEST I&R ONLY: CPT | Mod: ,,, | Performed by: INTERNAL MEDICINE

## 2023-02-09 PROCEDURE — 99214 OFFICE O/P EST MOD 30 MIN: CPT | Mod: 25,,, | Performed by: NURSE PRACTITIONER

## 2023-02-09 PROCEDURE — 93017 CV STRESS TEST TRACING ONLY: CPT

## 2023-02-09 PROCEDURE — 25000003 PHARM REV CODE 250: Performed by: STUDENT IN AN ORGANIZED HEALTH CARE EDUCATION/TRAINING PROGRAM

## 2023-02-09 PROCEDURE — G0378 HOSPITAL OBSERVATION PER HR: HCPCS

## 2023-02-09 PROCEDURE — 63600175 PHARM REV CODE 636 W HCPCS: Performed by: STUDENT IN AN ORGANIZED HEALTH CARE EDUCATION/TRAINING PROGRAM

## 2023-02-09 PROCEDURE — 78452 HT MUSCLE IMAGE SPECT MULT: CPT | Mod: TC

## 2023-02-09 PROCEDURE — 93018 NUCLEAR STRESS TEST (CUPID ONLY): ICD-10-PCS | Mod: ,,, | Performed by: INTERNAL MEDICINE

## 2023-02-09 PROCEDURE — 93016 CV STRESS TEST SUPVJ ONLY: CPT | Mod: ,,, | Performed by: INTERNAL MEDICINE

## 2023-02-09 PROCEDURE — 99214 PR OFFICE/OUTPT VISIT, EST, LEVL IV, 30-39 MIN: ICD-10-PCS | Mod: 25,,, | Performed by: NURSE PRACTITIONER

## 2023-02-09 PROCEDURE — A9502 TC99M TETROFOSMIN: HCPCS

## 2023-02-09 PROCEDURE — 93306 ECHO (CUPID ONLY): ICD-10-PCS | Mod: 26,,, | Performed by: INTERNAL MEDICINE

## 2023-02-09 PROCEDURE — 93016 NUCLEAR STRESS TEST (CUPID ONLY): ICD-10-PCS | Mod: ,,, | Performed by: INTERNAL MEDICINE

## 2023-02-09 PROCEDURE — 93306 TTE W/DOPPLER COMPLETE: CPT | Mod: 26,,, | Performed by: INTERNAL MEDICINE

## 2023-02-09 RX ORDER — REGADENOSON 0.08 MG/ML
0.4 INJECTION, SOLUTION INTRAVENOUS
Status: COMPLETED | OUTPATIENT
Start: 2023-02-09 | End: 2023-02-09

## 2023-02-09 RX ORDER — ASPIRIN 81 MG/1
81 TABLET ORAL DAILY
Qty: 90 TABLET | Refills: 0 | Status: SHIPPED | OUTPATIENT
Start: 2023-02-10 | End: 2023-12-14

## 2023-02-09 RX ADMIN — ASPIRIN 81 MG: 81 TABLET, COATED ORAL at 10:02

## 2023-02-09 RX ADMIN — REGADENOSON 0.4 MG: 0.08 INJECTION, SOLUTION INTRAVENOUS at 10:02

## 2023-02-09 RX ADMIN — OXYBUTYNIN CHLORIDE 5 MG: 5 TABLET ORAL at 10:02

## 2023-02-09 RX ADMIN — AMLODIPINE BESYLATE 5 MG: 5 TABLET ORAL at 10:02

## 2023-02-09 NOTE — NURSING
Received patient to floor in stable condition and without any complaints of CP or SOB; patient oriented to room, reviewed plan of care and instructed to call for assist; bed alarm set; bed in low position; call light within reach

## 2023-02-09 NOTE — DISCHARGE SUMMARY
[facility] Medicine  Discharge Summary      Patient Name: Lorraine Moreno  MRN: [id number]  ETHAN: [id number]  Patient Class: OP- Observation  Admission Date: 2/8/2023  Hospital Length of Stay: 0 days  Discharge Date and Time:  02/09/2023 2:19 PM  Attending Physician: No att. providers found   Discharging Provider: Jayme Dickerson MD  Primary Care Provider: Parth Dalton MD    Primary Care Team: Networked reference to record PCT     HPI:   71 yo F with PMH including HTN who presents for chest pain. Patient has been in her usual health last night when she began having chest pain radiating to shoulder. She took aspirin. As it had not improved in the AM, she decided to present to the ED. In the ED, patient's vitals are stable yet given patient's risk factors and T wave changes, hospitalist is requested to admit for chest pain observation.      * No surgery found *      Hospital Course:   Precautions.  Patient is a 72-year-old female who presented with chest pain.  She says that she had shoulder pain and rolled over during the night and her pain radiated to her chest and prompted her to come to the ED.  She was admitted for further evaluation.  Cardiac enzymes were negative and ACS was ruled out.  The patient underwent stress test for further evaluation and stress test was negative.  There were no signs of inducible ischemia.  She is not had any further chest pain at this time.  She has a cardiologist Dr. Huffman and will follow-up with him within a month.  She will also follow with her primary physician within 1 week.  She was discharged in good condition with plans for close outpatient follow-up.  I reviewed the discharge plan of care and instructions with the patient on the day of discharge.  She was given strict return precautions.       Goals of Care Treatment Preferences:  Code Status: Full Code      Consults:   Consults (From admission, onward)        Status Ordering Provider     Inpatient  consult to Cardiology  Once        Provider:  Clinton Huffman MD    Acknowledged NAVARRO LAWSON     Inpatient consult to Hospitalist  Once        Provider:  Jayme Dickerson MD    Acknowledged NAVARRO LAWSON          No new Assessment & Plan notes have been filed under this hospital service since the last note was generated.  Service: Hospital Medicine    Final Active Diagnoses:    Diagnosis Date Noted POA    PRINCIPAL PROBLEM:  Chest pain [R07.9] 02/09/2023 Yes      Problems Resolved During this Admission:       Discharged Condition: good    Disposition: Home or Self Care    Follow Up:   Follow-up Information     Parth Dalton MD. Schedule an appointment as soon as possible for a visit in 1 week(s).    Specialty: Family Medicine  Contact information:  [address]  [address]  [facility]  [place] [address]  [phone]             Clinton Huffman MD. Schedule an appointment as soon as possible for a visit in 1 month(s).    Specialties: Interventional Cardiology, Cardiology, Cardiovascular Disease  Contact information:  [address]  [address]  [place] [address]  [phone]                       Patient Instructions:      Diet Adult Regular     No dressing needed     Activity as tolerated       Significant Diagnostic Studies: Labs:   BMP:   Recent Labs   Lab 02/08/23  1152 02/08/23  1407   GLU 88  --      --    K 4.1  --      --    CO2 24  --    BUN 20  --    CREATININE 1.0  --    CALCIUM 9.3  --    MG  --  1.7   , CBC   Recent Labs   Lab 02/08/23  1152   WBC 4.60   HGB 12.4   HCT 37.9       and All labs within the past 24 hours have been reviewed    Pending Diagnostic Studies:     Procedure Component Value Units Date/Time    Echo [[id number]]  (Abnormal) Resulted: 02/09/23 0907    Order Status: Sent Lab Status: In process Updated: 02/09/23 0907     BSA 1.95 m2      TDI SEPTAL 0.06 m/s      LV LATERAL E/E' RATIO 10.17 m/s      LV SEPTAL E/E' RATIO 10.17  m/s      Left Ventricular Outflow Tract Mean Velocity 0.62 cm/s      Left Ventricular Outflow Tract Mean Gradient 2.00 mmHg      Pulmonary Valve Mean Velocity 0.53 m/s      TDI LATERAL 0.06 m/s      PV PEAK VELOCITY 0.77 cm/s      LVIDd 4.10 cm      IVS 1.09 cm      Posterior Wall 1.12 cm      LVIDs 3.67 cm      FS 10 %      LV mass 152.29 g      LA size 2.90 cm      TAPSE 1.94 cm      RV S' 0.01 cm/s      Left Ventricle Relative Wall Thickness 0.55 cm      AV mean gradient 5 mmHg      AV valve area 1.70 cm2      AV Velocity Ratio 0.65     AV index (prosthetic) 0.67     MV mean gradient 2 mmHg      MV valve area p 1/2 method 1.63 cm2      MV valve area by continuity eq 1.95 cm2      E/A ratio 0.66     Mean e' 0.06 m/s      E wave deceleration time 254.00 msec      LVOT diameter 1.80 cm      LVOT area 2.5 cm2      LVOT peak emigdio 0.97 m/s      LVOT peak VTI 23.40 cm      Ao peak emigdio 1.49 m/s      Ao VTI 35.0 cm      LVOT stroke volume 59.52 cm3      AV peak gradient 9 mmHg      MV peak gradient 4 mmHg      E/E' ratio 10.17 m/s      MV Peak E Emigdio 0.61 m/s      TR Max Emigdio 2.12 m/s      MV VTI 30.5 cm      MV stenosis pressure 1/2 time 135.00 ms      MV Peak A Emigdio 0.93 m/s      LV Systolic Volume 36.73 mL      LV Systolic Volume Index 19.1 mL/m2      LV Diastolic Volume 74.22 mL      LV Diastolic Volume Index 38.66 mL/m2      LV Mass Index 79 g/m2      Triscuspid Valve Regurgitation Peak Gradient 18 mmHg      LA Volume Index (Mod) 23.0 mL/m2      LA volume (mod) 44.10 cm3     Narrative:      · The left ventricle is normal in size with concentric remodeling and       Impression:               Medications:  Reconciled Home Medications:      Medication List      START taking these medications    aspirin 81 MG EC tablet  Commonly known as: ECOTRIN  Take 1 tablet (81 mg total) by mouth once daily.  Start taking on: February 10, 2023        CHANGE how you take these medications    amLODIPine 5 MG tablet  Commonly known as:  NORVASC  TAKE 1 TABLET DAILY  What changed: when to take this        CONTINUE taking these medications    b complex vitamins tablet  Take 1 tablet by mouth once daily.     CALCIUM 300 ORAL  Take 1,200 mg by mouth once daily. pt is staying with daughter left supplements at home she states she will resume them when she returns     clotrimazole-betamethasone 1-0.05% cream  Commonly known as: LOTRISONE  Apply 1 g topically as needed.     ferrous sulfate 325 mg (65 mg iron) Tab tablet  Commonly known as: FEOSOL  Take 325 mg by mouth daily with breakfast.     flaxseed 1,000 mg Cap  Take 1 capsule by mouth once daily. pt is staying with daughter left supplements at home she states she will resume them when she returns     HYDROcodone-acetaminophen  mg per tablet  Commonly known as: NORCO  Take 1 tablet by mouth every 12 (twelve) hours as needed.     MAGNESIUM ORAL  Take 500 mg by mouth every evening. pt is staying with daughter left supplements at home she states she will resume them when she returns     multivitamin per tablet  Commonly known as: THERAGRAN  Take 1 tablet by mouth once daily. pt is staying with daughter left supplements at home she states she will resume them when she returns     oxybutynin 5 MG Tab  Commonly known as: DITROPAN  Take 5 mg by mouth once daily.     pravastatin 20 MG tablet  Commonly known as: PRAVACHOL  Take 1 tablet (20 mg total) by mouth every evening.            Indwelling Lines/Drains at time of discharge:   Lines/Drains/Airways     None                 Time spent on the discharge of patient: 55 minutes         Jayme Dickerson MD  Department of Hospital Medicine  [facility]

## 2023-02-09 NOTE — PLAN OF CARE
[facility]  Initial Discharge Assessment       Primary Care Provider: Parth Dalton MD    Admission Diagnosis: Chest pain [R07.9]    Admission Date: 2/8/2023    DC assessment completed with patient at bedside.  Information verified as correct on facesheet.  Patient denies HPOA.  Denies HH/HD/Coumadin.  Patient has straight cane that she uses at home.  Patient independent in ADL's.  DC plan is home. Patient to provide own transport on discharge.             Payor: [employer] [place] MEDICARE / Plan: [facility] [place] / Product Type: Medicare Advantage /     Extended Emergency Contact Information  Primary Emergency Contact: Esme Naidu  Address: [address]           [place] [address] [address] [facility]  Home Phone: [phone]  Mobile Phone: [phone]  Relation: Daughter  Preferred language: English   needed? No  Secondary Emergency Contact: Adrianna Poole  Mobile Phone: [phone]  Relation: Daughter  Preferred language: English   needed? No    Discharge Plan A: (P) Home  Discharge Plan B: (P) Home      [id number] - [place] - [address]  [address]  [facility] [address]  Phone: [phone] Fax: [phone]    [facility] - [place], [place] - [address]  [address] [address]  Phone: [phone] Fax: [phone]      Initial Assessment (most recent)       Adult Discharge Assessment - 02/09/23 1251          Discharge Assessment    Assessment Type Discharge Planning Assessment (P)      Confirmed/corrected address, phone number and insurance Yes (P)      Confirmed Demographics Correct on Facesheet (P)      Source of Information patient (P)      Does patient/caregiver understand observation status Yes (P)      Communicated DANNY with patient/caregiver Yes (P)      Reason For Admission chest pain (P)      People in Home alone (P)       Facility Arrived From: home (P)      Do you expect to return to your current living situation? Yes (P)      Do you have help at home or someone to help you manage your care at home? No (P)      Current cognitive status: Alert/Oriented (P)      Equipment Currently Used at Home cane, straight (P)      Readmission within 30 days? No (P)      Patient currently being followed by outpatient case management? No (P)      Do you currently have service(s) that help you manage your care at home? No (P)      Do you take prescription medications? Yes (P)      Do you have prescription coverage? Yes (P)      Do you have any problems affording any of your prescribed medications? No (P)      Is the patient taking medications as prescribed? yes (P)      How do you get to doctors appointments? car, drives self (P)      Are you on dialysis? No (P)      Do you take coumadin? No (P)      Discharge Plan A Home (P)      Discharge Plan B Home (P)      DME Needed Upon Discharge  none (P)      Discharge Plan discussed with: Patient (P)

## 2023-02-09 NOTE — PLAN OF CARE
PUCKETT explained to patient.  Patient verbalized understanding and signed PUCKETT form.  Scanned into media manager.     02/09/23 125   PUCKETT Message   Medicare Outpatient and Observation Notification regarding financial responsibility Given to patient/caregiver;Explained to patient/caregiver;Signed/date by patient/caregiver   Date PUCKETT was signed 02/09/23   Time PUCKETT was signed [id number]

## 2023-02-09 NOTE — PROGRESS NOTES
@ 10:01 Patient injected with Myoview for Stress images.   R.C.  Lexiscan Stress    RELEASE NPO STATUS FROM NUCLEAR MEDICINE.

## 2023-02-09 NOTE — H&P
[facility] Medicine  History & Physical    Patient Name: Lorraine Moreno  MRN: [id number]  Patient Class: OP- Observation  Admission Date: 2/8/2023  Attending Physician: Tamara Copeland MD   Primary Care Provider: Parth Dalton MD         Patient information was obtained from patient, past medical records and ER records.     Subjective:     Principal Problem:Chest pain    Chief Complaint:   Chief Complaint   Patient presents with    Chest Pain     HEAVINESS ONSET 1 AM    Shoulder Pain     RT        HPI: 71 yo F with PMH including HTN who presents for chest pain. Patient has been in her usual health last night when she began having chest pain radiating to shoulder. She took aspirin. As it had not improved in the AM, she decided to present to the ED. In the ED, patient's vitals are stable yet given patient's risk factors and T wave changes, hospitalist is requested to admit for chest pain observation.      Past Medical History:   Diagnosis Date    Back pain     Bruxism, sleep-related     wears appliance    H/O total hip arthroplasty, right     Hypercholesterolemia     Hypertension     Obesity     Primary osteoarthritis of knees, bilateral     Primary osteoarthritis of right knee 11/2018    Urinary incontinence        Past Surgical History:   Procedure Laterality Date    ANKLE SURGERY Right     EYE SURGERY      HIP ARTHROPLASTY      8/18/15 right JACKELYN /J&J Depuy press fit    HIP SURGERY Left 03/2022    JOINT REPLACEMENT Right 12/12/2018    TKA    LASIK Bilateral     TOTAL KNEE ARTHROPLASTY Right 12/12/2018    Procedure: ARTHROPLASTY, KNEE, TOTAL;  Surgeon: Malik Herzog MD;  [facility];  Service: Orthopedics;  Laterality: Right;    TUBAL LIGATION         Review of patient's allergies indicates:  No Known Allergies    No current facility-administered medications on file prior to encounter.     Current Outpatient Medications on File Prior to Encounter   Medication Sig     amLODIPine (NORVASC) 5 MG tablet TAKE 1 TABLET DAILY (Patient taking differently: Take 5 mg by mouth once daily.)    b complex vitamins tablet Take 1 tablet by mouth once daily.    CALCIUM CARBONATE (CALCIUM 300 ORAL) Take 1,200 mg by mouth once daily. pt is staying with daughter left supplements at home she states she will resume them when she returns    flaxseed 1,000 mg Cap Take 1 capsule by mouth once daily. pt is staying with daughter left supplements at home she states she will resume them when she returns    multivitamin (THERAGRAN) per tablet Take 1 tablet by mouth once daily. pt is staying with daughter left supplements at home she states she will resume them when she returns    oxybutynin (DITROPAN) 5 MG Tab Take 5 mg by mouth once daily.     pravastatin (PRAVACHOL) 20 MG tablet Take 1 tablet (20 mg total) by mouth every evening.    clotrimazole-betamethasone 1-0.05% (LOTRISONE) cream Apply 1 g topically as needed.    ferrous sulfate (FEOSOL) 325 mg (65 mg iron) Tab tablet Take 325 mg by mouth daily with breakfast.    HYDROcodone-acetaminophen (NORCO)  mg per tablet Take 1 tablet by mouth every 12 (twelve) hours as needed.    MAGNESIUM ORAL Take 500 mg by mouth every evening. pt is staying with daughter left supplements at home she states she will resume them when she returns     Family History       Problem Relation (Age of Onset)    Early death Father (55)    Heart attack Father    Heart disease Father    No Known Problems Mother, Brother, Brother          Tobacco Use    Smoking status: Never    Smokeless tobacco: Never   Substance and Sexual Activity    Alcohol use: Yes     Alcohol/week: 5.0 standard drinks     Types: 5 Glasses of wine per week    Drug use: No    Sexual activity: Not Currently     Review of Systems   Constitutional:  Negative for chills and fever.   HENT:  Negative for hearing loss and sore throat.    Eyes:  Negative for pain and redness.   Respiratory:   Negative for cough and shortness of breath.    Cardiovascular:  Positive for chest pain. Negative for palpitations.   Gastrointestinal:  Negative for diarrhea and nausea.   Genitourinary:  Negative for dysuria and flank pain.   Musculoskeletal:  Negative for back pain and gait problem.   Skin:  Negative for rash and wound.   Neurological:  Negative for dizziness and headaches.   Hematological:  Negative for adenopathy. Does not bruise/bleed easily.   Psychiatric/Behavioral:  Negative for confusion and hallucinations.    Objective:     Vital Signs (Most Recent):  Temp: 97.6 °F (36.4 °C) (02/09/23 0324)  Pulse: (!) 58 (02/09/23 0324)  Resp: 18 (02/09/23 0324)  BP: (!) 149/85 (02/09/23 0324)  SpO2: 97 % (02/09/23 0324)   Vital Signs (24h Range):  Temp:  [97.6 °F (36.4 °C)-98 °F (36.7 °C)] 97.6 °F (36.4 °C)  Pulse:  [58-85] 58  Resp:  [16-27] 18  SpO2:  [95 %-100 %] 97 %  BP: (124-183)/(64-91) 149/85     Weight: 81.1 kg (178 lb 12.8 oz)  Body mass index is 28 kg/m².    Physical Exam  Vitals reviewed.   Constitutional:       General: She is not in acute distress.     Appearance: She is not toxic-appearing.   HENT:      Head: Normocephalic and atraumatic.   Eyes:      Extraocular Movements: Extraocular movements intact.      Conjunctiva/sclera: Conjunctivae normal.   Cardiovascular:      Rate and Rhythm: Normal rate and regular rhythm.   Pulmonary:      Effort: Pulmonary effort is normal.      Breath sounds: Normal breath sounds.   Abdominal:      Palpations: Abdomen is soft.      Tenderness: There is no abdominal tenderness.   Musculoskeletal:         General: No deformity. Normal range of motion.      Cervical back: Normal range of motion and neck supple.   Skin:     General: Skin is warm and dry.   Neurological:      General: No focal deficit present.      Mental Status: She is alert and oriented to person, place, and time.   Psychiatric:         Mood and Affect: Mood normal.         Judgment: Judgment normal.            Significant Labs:   All pertinent labs within the past 24 hours have been reviewed.  Significant Imaging: I have reviewed all pertinent imaging results/findings within the past 24 hours.    Assessment/Plan:     Chest pain rule out ACS  Hypertension  HLD    -Trend trops  -Telemetry  -ASA, statin  -Echo  -NPO  -Nuclear stress test  -Trend labs  -Cardio consulted    FULL CODE  DVT ppx Lovenox    VTE Risk Mitigation (From admission, onward)         Ordered     enoxaparin injection 40 mg  Daily         02/08/23 1923     IP VTE HIGH RISK PATIENT  Once         02/08/23 1923     Place sequential compression device  Until discontinued         02/08/23 1923                   Tamara Copeland MD  Department of Hospital Medicine   [facility]

## 2023-02-09 NOTE — SUBJECTIVE & OBJECTIVE
Past Medical History:   Diagnosis Date    Back pain     Bruxism, sleep-related     wears appliance    H/O total hip arthroplasty, right     Hypercholesterolemia     Hypertension     Obesity     Primary osteoarthritis of knees, bilateral     Primary osteoarthritis of right knee 11/2018    Urinary incontinence        Past Surgical History:   Procedure Laterality Date    ANKLE SURGERY Right     EYE SURGERY      HIP ARTHROPLASTY      8/18/15 right JACKELYN /J&J Depuy press fit    HIP SURGERY Left 03/2022    JOINT REPLACEMENT Right 12/12/2018    TKA    LASIK Bilateral     TOTAL KNEE ARTHROPLASTY Right 12/12/2018    Procedure: ARTHROPLASTY, KNEE, TOTAL;  Surgeon: Malik Herzog MD;  Location: [facility];  Service: Orthopedics;  Laterality: Right;    TUBAL LIGATION         Review of patient's allergies indicates:  No Known Allergies    No current facility-administered medications on file prior to encounter.     Current Outpatient Medications on File Prior to Encounter   Medication Sig    amLODIPine (NORVASC) 5 MG tablet TAKE 1 TABLET DAILY (Patient taking differently: Take 5 mg by mouth once daily.)    b complex vitamins tablet Take 1 tablet by mouth once daily.    CALCIUM CARBONATE (CALCIUM 300 ORAL) Take 1,200 mg by mouth once daily. pt is staying with daughter left supplements at home she states she will resume them when she returns    flaxseed 1,000 mg Cap Take 1 capsule by mouth once daily. pt is staying with daughter left supplements at home she states she will resume them when she returns    multivitamin (THERAGRAN) per tablet Take 1 tablet by mouth once daily. pt is staying with daughter left supplements at home she states she will resume them when she returns    oxybutynin (DITROPAN) 5 MG Tab Take 5 mg by mouth once daily.     pravastatin (PRAVACHOL) 20 MG tablet Take 1 tablet (20 mg total) by mouth every evening.    clotrimazole-betamethasone 1-0.05% (LOTRISONE) cream Apply 1 g topically as needed.    ferrous sulfate  (FEOSOL) 325 mg (65 mg iron) Tab tablet Take 325 mg by mouth daily with breakfast.    HYDROcodone-acetaminophen (NORCO)  mg per tablet Take 1 tablet by mouth every 12 (twelve) hours as needed.    MAGNESIUM ORAL Take 500 mg by mouth every evening. pt is staying with daughter left supplements at home she states she will resume them when she returns     Family History       Problem Relation (Age of Onset)    Early death Father (55)    Heart attack Father    Heart disease Father    No Known Problems Mother, Brother, Brother          Tobacco Use    Smoking status: Never    Smokeless tobacco: Never   Substance and Sexual Activity    Alcohol use: Yes     Alcohol/week: 5.0 standard drinks     Types: 5 Glasses of wine per week    Drug use: No    Sexual activity: Not Currently     Review of Systems   Constitutional:  Negative for chills and fever.   HENT:  Negative for hearing loss and sore throat.    Eyes:  Negative for pain and redness.   Respiratory:  Negative for cough and shortness of breath.    Cardiovascular:  Positive for chest pain. Negative for palpitations.   Gastrointestinal:  Negative for diarrhea and nausea.   Genitourinary:  Negative for dysuria and flank pain.   Musculoskeletal:  Negative for back pain and gait problem.   Skin:  Negative for rash and wound.   Neurological:  Negative for dizziness and headaches.   Hematological:  Negative for adenopathy. Does not bruise/bleed easily.   Psychiatric/Behavioral:  Negative for confusion and hallucinations.    Objective:     Vital Signs (Most Recent):  Temp: 97.6 °F (36.4 °C) (02/09/23 0324)  Pulse: (!) 58 (02/09/23 0324)  Resp: 18 (02/09/23 0324)  BP: (!) 149/85 (02/09/23 0324)  SpO2: 97 % (02/09/23 0324)   Vital Signs (24h Range):  Temp:  [97.6 °F (36.4 °C)-98 °F (36.7 °C)] 97.6 °F (36.4 °C)  Pulse:  [58-85] 58  Resp:  [16-27] 18  SpO2:  [95 %-100 %] 97 %  BP: (124-183)/(64-91) 149/85     Weight: 81.1 kg (178 lb 12.8 oz)  Body mass index is 28  kg/m².    Physical Exam  Vitals reviewed.   Constitutional:       General: She is not in acute distress.     Appearance: She is not toxic-appearing.   HENT:      Head: Normocephalic and atraumatic.   Eyes:      Extraocular Movements: Extraocular movements intact.      Conjunctiva/sclera: Conjunctivae normal.   Cardiovascular:      Rate and Rhythm: Normal rate and regular rhythm.   Pulmonary:      Effort: Pulmonary effort is normal.      Breath sounds: Normal breath sounds.   Abdominal:      Palpations: Abdomen is soft.      Tenderness: There is no abdominal tenderness.   Musculoskeletal:         General: No deformity. Normal range of motion.      Cervical back: Normal range of motion and neck supple.   Skin:     General: Skin is warm and dry.   Neurological:      General: No focal deficit present.      Mental Status: She is alert and oriented to person, place, and time.   Psychiatric:         Mood and Affect: Mood normal.         Judgment: Judgment normal.           Significant Labs:   All pertinent labs within the past 24 hours have been reviewed.  Significant Imaging: I have reviewed all pertinent imaging results/findings within the past 24 hours.

## 2023-02-09 NOTE — HPI
73 yo F with PMH including HTN who presents for chest pain. Patient has been in her usual health last night when she began having chest pain radiating to shoulder. She took aspirin. As it had not improved in the AM, she decided to present to the ED. In the ED, patient's vitals are stable yet given patient's risk factors and T wave changes, hospitalist is requested to admit for chest pain observation.

## 2023-02-09 NOTE — PLAN OF CARE
DC orders and chart reviewed. No discharge needs noted.  Patient cleared for discharge from .  Patient discharging to home.      02/09/23 1256   Final Note   Assessment Type Final Discharge Note   Anticipated Discharge Disposition Home   What phone number can be called within the next 1-3 days to see how you are doing after discharge? [phone]   Hospital Resources/Appts/Education Provided Provided patient/caregiver with written discharge plan information

## 2023-02-09 NOTE — CONSULTS
[facility]  Department of Cardiology  Consult Note      PATIENT NAME: Lorraine Moreno  MRN: [id number]  TODAY'S DATE: 02/09/2023  ADMIT DATE: 2/8/2023                          CONSULT REQUESTED BY: Jayme Dickerson MD    SUBJECTIVE     PRINCIPAL PROBLEM: Chest pain      REASON FOR CONSULT:  Chest Pain      HPI:    Patient seen in stress lab and examined.  She tells me pain happened while sleeping two times. Pain started in Right shoulder and radiated to chest and back. No associated symptoms. Time is what stopped it she tells me.       FROM H AND P       Chest Pain       HEAVINESS ONSET 1 AM    Shoulder Pain       RT         HPI: 71 yo F with PMH including HTN who presents for chest pain. Patient has been in her usual health last night when she began having chest pain radiating to shoulder. She took aspirin. As it had not improved in the AM, she decided to present to the ED. In the ED, patient's vitals are stable yet given patient's risk factors and T wave changes, hospitalist is requested to admit for chest pain observation.      Review of patient's allergies indicates:  No Known Allergies    Past Medical History:   Diagnosis Date    Back pain     Bruxism, sleep-related     wears appliance    H/O total hip arthroplasty, right     Hypercholesterolemia     Hypertension     Obesity     Primary osteoarthritis of knees, bilateral     Primary osteoarthritis of right knee 11/2018    Urinary incontinence      Past Surgical History:   Procedure Laterality Date    ANKLE SURGERY Right     EYE SURGERY      HIP ARTHROPLASTY      8/18/15 right JACKELYN /J&J Depuy press fit    HIP SURGERY Left 03/2022    JOINT REPLACEMENT Right 12/12/2018    TKA    LASIK Bilateral     TOTAL KNEE ARTHROPLASTY Right 12/12/2018    Procedure: ARTHROPLASTY, KNEE, TOTAL;  Surgeon: Malik Herzog MD;  [facility];  Service: Orthopedics;  Laterality: Right;    TUBAL LIGATION       Social History     Tobacco Use    Smoking status: Never     Smokeless tobacco: Never   Substance Use Topics    Alcohol use: Yes     Alcohol/week: 5.0 standard drinks     Types: 5 Glasses of wine per week    Drug use: No        REVIEW OF SYSTEMS  PER HPI    OBJECTIVE     VITAL SIGNS (Most Recent)  Temp: 97.6 °F (36.4 °C) (02/09/23 0324)  Pulse: (!) 58 (02/09/23 0324)  Resp: 17 (02/09/23 1026)  BP: (!) 164/91 (02/09/23 1026)  SpO2: 98 % (02/09/23 1026)    VENTILATION STATUS  Resp: 17 (02/09/23 1026)  SpO2: 98 % (02/09/23 1026)       I & O (Last 24H):No intake or output data in the 24 hours ending 02/09/23 1050    WEIGHTS  Wt Readings from Last 3 Encounters:   02/09/23 0040 81.1 kg (178 lb 12.8 oz)   02/08/23 1033 81.6 kg (180 lb)   02/09/23 0810 80.7 kg (178 lb)   08/24/22 1422 80.7 kg (178 lb)       PHYSICAL EXAM  GENERAL: well built, well nourished, well-developed in no apparent distress alert and oriented.   HEENT: Normocephalic. Pupils normal and conjunctivae normal.  Mucous membranes normal, no cyanosis or icterus, trachea central,no pallor or icterus is noted..   NECK: No JVD. No bruit..   THYROID: Thyroid not enlarged. No nodules present..   CARDIAC: Regular rate and rhythm. S1 is normal.S2 is normal.No gallops, clicks or murmurs noted at this time.  CHEST ANATOMY: normal.   LUNGS: Clear to auscultation. No wheezing or rhonchi..   ABDOMEN: Soft no masses or organomegaly.  No abdomen pulsations or bruits.  Normal bowel sounds. No pulsations and no masses felt, No guarding or rebound.   URINARY: No kevin catheter   EXTREMITIES: No cyanosis, clubbing or edema noted at this time., no calf tenderness bilaterally.   PERIPHERAL VASCULAR SYSTEM: Good palpable distal pulses.   CENTRAL NERVOUS SYSTEM: No focal motor or sensory deficits noted.   SKIN: Skin without lesions, moist, well perfused.   MUSCLE STRENGTH & TONE: No noteable weakness, atrophy or abnormal movement.     HOME MEDICATIONS:  No current facility-administered medications on file prior to encounter.     Current  Outpatient Medications on File Prior to Encounter   Medication Sig Dispense Refill    amLODIPine (NORVASC) 5 MG tablet TAKE 1 TABLET DAILY (Patient taking differently: Take 5 mg by mouth once daily.) 90 tablet 1    b complex vitamins tablet Take 1 tablet by mouth once daily.      CALCIUM CARBONATE (CALCIUM 300 ORAL) Take 1,200 mg by mouth once daily. pt is staying with daughter left supplements at home she states she will resume them when she returns      flaxseed 1,000 mg Cap Take 1 capsule by mouth once daily. pt is staying with daughter left supplements at home she states she will resume them when she returns      multivitamin (THERAGRAN) per tablet Take 1 tablet by mouth once daily. pt is staying with daughter left supplements at home she states she will resume them when she returns      oxybutynin (DITROPAN) 5 MG Tab Take 5 mg by mouth once daily.   12    pravastatin (PRAVACHOL) 20 MG tablet Take 1 tablet (20 mg total) by mouth every evening. 90 tablet 3    clotrimazole-betamethasone 1-0.05% (LOTRISONE) cream Apply 1 g topically as needed.      ferrous sulfate (FEOSOL) 325 mg (65 mg iron) Tab tablet Take 325 mg by mouth daily with breakfast.      HYDROcodone-acetaminophen (NORCO)  mg per tablet Take 1 tablet by mouth every 12 (twelve) hours as needed. 40 tablet 0    MAGNESIUM ORAL Take 500 mg by mouth every evening. pt is staying with daughter left supplements at home she states she will resume them when she returns         SCHEDULED MEDS:   amLODIPine  5 mg Oral Daily    aspirin  81 mg Oral Daily    enoxaparin  40 mg Subcutaneous Daily    oxybutynin  5 mg Oral Daily    pravastatin  20 mg Oral QHS       CONTINUOUS INFUSIONS:    PRN MEDS:acetaminophen, melatonin, nitroGLYCERIN, sodium chloride 0.9%    LABS AND DIAGNOSTICS     CBC LAST 3 DAYS  Recent Labs   Lab 02/08/23  1152   WBC 4.60   RBC 4.07   HGB 12.4   HCT 37.9   MCV 93   MCH 30.5   MCHC 32.7   RDW 13.6      MPV 9.4   GRAN 65.0  3.0   LYMPH  23.7  1.1   MONO 8.5  0.4   BASO 0.05   NRBC 0       COAGULATION LAST 3 DAYS  Recent Labs   Lab 02/08/23  1152   INR 1.0       CHEMISTRY LAST 3 DAYS  Recent Labs   Lab 02/08/23  1152 02/08/23  1407     --    K 4.1  --      --    CO2 24  --    ANIONGAP 10  --    BUN 20  --    CREATININE 1.0  --    GLU 88  --    CALCIUM 9.3  --    MG  --  1.7   ALBUMIN 4.2  --    PROT 6.8  --    ALKPHOS 68  --    ALT 13  --    AST 20  --    BILITOT 0.6  --        CARDIAC PROFILE LAST 3 DAYS  Recent Labs   Lab 02/08/23  1152 02/08/23  1407 02/08/23  2020   BNP 36  --   --    CPK  --  108  --    TROPONINIHS 3.0 2.9 3.7       ENDOCRINE LAST 3 DAYS  Recent Labs   Lab 02/08/23  1407   TSH 2.480       LAST ARTERIAL BLOOD GAS  ABG  No results for input(s): PH, PO2, PCO2, HCO3, BE in the last 168 hours.    LAST 7 DAYS MICROBIOLOGY   Microbiology Results (last 7 days)       ** No results found for the last 168 hours. **            MOST RECENT IMAGING  X-Ray Chest AP Portable  Chest single view    Clinical data:Chest pain    FINDINGS: AP view of the chest demonstrates the heart to be upper normal in size. The thoracic aorta is mildly elongated. The lungs are clear. There is mild elevation of left hemidiaphragm, unchanged when compared to 2018.    Osteoarthritic changes are noted at both shoulders.    IMPRESSION:  1. Chronic findings as above. No acute radiographic abnormalities.    Electronically signed by:  Monty Diaz MD  2/8/2023 11:42 AM [facility] Workstation: [id number]      ECHOCARDIOGRAM RESULTS (last 5)  Results for orders placed during the hospital encounter of 02/02/22    Echo    Interpretation Summary  · The left ventricle is normal in size with concentric remodeling and normal systolic function.  · The estimated ejection fraction is 57%.  · Normal left ventricular diastolic function.  · Normal right ventricular size with normal right ventricular systolic function.      CURRENT/PREVIOUS VISIT EKG  Results for orders  placed or performed during the hospital encounter of 02/08/23   EKG 12-lead    Collection Time: 02/08/23 10:34 AM    Narrative    Test Reason : R07.9,    Vent. Rate : 079 BPM     Atrial Rate : 079 BPM     P-R Int : 160 ms          QRS Dur : 072 ms      QT Int : 368 ms       P-R-T Axes : 035 022 053 degrees     QTc Int : 421 ms    Normal sinus rhythm  Normal ECG  When compared with ECG of 22-AUG-2022 14:16,  No significant change was found    Referred By: [email or web address]   SELF           Confirmed By:            ASSESSMENT/PLAN:     Active Hospital Problems    Diagnosis    *Chest pain       ASSESSMENT & PLAN:       Chest Pain  HTN  Dyslipidemia      RECOMMENDATIONS:      Await Myoview.   Further recommendations based upon this.   Thank you for the consult.    Jessi Yen NP  [facility]  Department of Cardiology  Date of Service: 02/09/2023      I couldn't see the pt today as pt was already discharged home prior to my evaluation.     Fadi Thompson MD  Department of Cardiology  [facility]  2/9/23

## 2023-02-14 ENCOUNTER — TELEPHONE (OUTPATIENT)
Dept: CARDIOLOGY | Facility: CLINIC | Age: 73
End: 2023-02-14
Payer: MEDICARE

## 2023-02-14 NOTE — TELEPHONE ENCOUNTER
----- Message from Jerrod Mcgregor sent at 2/14/2023  1:37 PM CST -----  Type:  Sooner Appointment Request    Caller is requesting a sooner appointment.  Caller declined first available appointment listed below.  Caller will not accept being placed on the waitlist and is requesting a message be sent to doctor.    Name of Caller:  pt  When is the first available appointment?  4/13  Symptoms:  hospital f/u dc 2/09 need a 2 week appt--need to be seen sooner  Best Call Back Number:  [phone] (home)     Additional Information:  please call and advise--thank you

## 2023-02-16 ENCOUNTER — TELEPHONE (OUTPATIENT)
Dept: CARDIOLOGY | Facility: CLINIC | Age: 73
End: 2023-02-16
Payer: MEDICARE

## 2023-02-16 ENCOUNTER — HOSPITAL ENCOUNTER (OUTPATIENT)
Dept: RADIOLOGY | Facility: HOSPITAL | Age: 73
Discharge: HOME | End: 2023-02-16
Attending: SPECIALIST
Payer: MEDICARE

## 2023-02-16 DIAGNOSIS — Z78.0 MENOPAUSE: ICD-10-CM

## 2023-02-16 DIAGNOSIS — Z12.31 VISIT FOR SCREENING MAMMOGRAM: ICD-10-CM

## 2023-02-16 PROCEDURE — 77067 SCR MAMMO BI INCL CAD: CPT | Mod: TC

## 2023-02-16 PROCEDURE — 77067 SCR MAMMO BI INCL CAD: CPT | Mod: 26,,, | Performed by: RADIOLOGY

## 2023-02-16 PROCEDURE — 77063 MAMMO DIGITAL SCREENING BILAT WITH TOMO: ICD-10-PCS | Mod: 26,,, | Performed by: RADIOLOGY

## 2023-02-16 PROCEDURE — 77067 MAMMO DIGITAL SCREENING BILAT WITH TOMO: ICD-10-PCS | Mod: 26,,, | Performed by: RADIOLOGY

## 2023-02-16 PROCEDURE — 77063 BREAST TOMOSYNTHESIS BI: CPT | Mod: 26,,, | Performed by: RADIOLOGY

## 2023-02-16 NOTE — TELEPHONE ENCOUNTER
----- Message from Jerrod Mcgregor sent at 2/16/2023 10:41 AM CST -----  Type:  Sooner Appointment Request    Caller is requesting a sooner appointment.  Caller declined first available appointment listed below.  Caller will not accept being placed on the waitlist and is requesting a message be sent to doctor.    Name of Caller:  pt  When is the first available appointment?  in April  Symptoms:  Hospital f/u dc 2/8 and she need a 2 week appt--said she called yesterday and no one returned her call--please call and advise  Best Call Back Number:  [phone] (home)     Additional Information:  thank you

## 2023-03-14 ENCOUNTER — OFFICE VISIT (OUTPATIENT)
Dept: CARDIOLOGY | Facility: CLINIC | Age: 73
End: 2023-03-14
Payer: MEDICARE

## 2023-03-14 VITALS
HEIGHT: 67 IN | WEIGHT: 181 LBS | RESPIRATION RATE: 16 BRPM | OXYGEN SATURATION: 98 % | HEART RATE: 79 BPM | BODY MASS INDEX: 28.41 KG/M2 | SYSTOLIC BLOOD PRESSURE: 130 MMHG | DIASTOLIC BLOOD PRESSURE: 80 MMHG

## 2023-03-14 DIAGNOSIS — E66.9 OBESITY (BMI 30.0-34.9): ICD-10-CM

## 2023-03-14 DIAGNOSIS — E78.2 MIXED HYPERLIPIDEMIA: ICD-10-CM

## 2023-03-14 DIAGNOSIS — R07.89 ATYPICAL CHEST PAIN: ICD-10-CM

## 2023-03-14 DIAGNOSIS — I10 ESSENTIAL HYPERTENSION: Primary | ICD-10-CM

## 2023-03-14 PROCEDURE — 1126F PR PAIN SEVERITY QUANTIFIED, NO PAIN PRESENT: ICD-10-PCS | Mod: CPTII,S$GLB,,

## 2023-03-14 PROCEDURE — 3008F BODY MASS INDEX DOCD: CPT | Mod: CPTII,S$GLB,,

## 2023-03-14 PROCEDURE — 3079F DIAST BP 80-89 MM HG: CPT | Mod: CPTII,S$GLB,,

## 2023-03-14 PROCEDURE — 99214 PR OFFICE/OUTPT VISIT, EST, LEVL IV, 30-39 MIN: ICD-10-PCS | Mod: S$GLB,,,

## 2023-03-14 PROCEDURE — 1160F RVW MEDS BY RX/DR IN RCRD: CPT | Mod: CPTII,S$GLB,,

## 2023-03-14 PROCEDURE — 1126F AMNT PAIN NOTED NONE PRSNT: CPT | Mod: CPTII,S$GLB,,

## 2023-03-14 PROCEDURE — 3288F FALL RISK ASSESSMENT DOCD: CPT | Mod: CPTII,S$GLB,,

## 2023-03-14 PROCEDURE — 99999 PR PBB SHADOW E&M-EST. PATIENT-LVL IV: ICD-10-PCS | Mod: PBBFAC,,,

## 2023-03-14 PROCEDURE — 99214 OFFICE O/P EST MOD 30 MIN: CPT | Mod: S$GLB,,,

## 2023-03-14 PROCEDURE — 3075F SYST BP GE 130 - 139MM HG: CPT | Mod: CPTII,S$GLB,,

## 2023-03-14 PROCEDURE — 99999 PR PBB SHADOW E&M-EST. PATIENT-LVL IV: CPT | Mod: PBBFAC,,,

## 2023-03-14 PROCEDURE — 1101F PR PT FALLS ASSESS DOC 0-1 FALLS W/OUT INJ PAST YR: ICD-10-PCS | Mod: CPTII,S$GLB,,

## 2023-03-14 PROCEDURE — 3288F PR FALLS RISK ASSESSMENT DOCUMENTED: ICD-10-PCS | Mod: CPTII,S$GLB,,

## 2023-03-14 PROCEDURE — 1159F MED LIST DOCD IN RCRD: CPT | Mod: CPTII,S$GLB,,

## 2023-03-14 PROCEDURE — 3008F PR BODY MASS INDEX (BMI) DOCUMENTED: ICD-10-PCS | Mod: CPTII,S$GLB,,

## 2023-03-14 PROCEDURE — 1159F PR MEDICATION LIST DOCUMENTED IN MEDICAL RECORD: ICD-10-PCS | Mod: CPTII,S$GLB,,

## 2023-03-14 PROCEDURE — 1160F PR REVIEW ALL MEDS BY PRESCRIBER/CLIN PHARMACIST DOCUMENTED: ICD-10-PCS | Mod: CPTII,S$GLB,,

## 2023-03-14 PROCEDURE — 3075F PR MOST RECENT SYSTOLIC BLOOD PRESS GE 130-139MM HG: ICD-10-PCS | Mod: CPTII,S$GLB,,

## 2023-03-14 PROCEDURE — 3079F PR MOST RECENT DIASTOLIC BLOOD PRESSURE 80-89 MM HG: ICD-10-PCS | Mod: CPTII,S$GLB,,

## 2023-03-14 PROCEDURE — 1101F PT FALLS ASSESS-DOCD LE1/YR: CPT | Mod: CPTII,S$GLB,,

## 2023-03-14 NOTE — ASSESSMENT & PLAN NOTE
Patient was recently in hospital for atypical chest pain.  Stress test was negative.  Negative cardiac enzymes.  EKG showed no acute STT wave changes.  Patient has not had any further chest pain since discharge.  Continue current medication regimen.    Patient follow up in 6 months.

## 2023-03-14 NOTE — PROGRESS NOTES
Subjective:    Patient ID:  Lorraine Moreno is a 73 y.o. female patient here for evaluation Hospital Follow Up      History of Present Illness:     Patient is here for hospital follow up.  Patient presented to the ED with complains of right shoulder pain and chest pain.  Patient had negative cardiac enzymes, no acute EKG wave changes.  Patient also had a negative stress test and normal echocardiogram.    Patient denies further chest pains since ER visit.  Blood pressure today in office is 130/80.  Patient is taking her medications as prescribed.  She denies chest pain, shortness of breath, dizziness, lightheadedness, palpitations, jaw neck or arm pain, bleeding or edema.      HPI:   71 yo F with PMH including HTN who presents for chest pain. Patient has been in her usual health last night when she began having chest pain radiating to shoulder. She took aspirin. As it had not improved in the AM, she decided to present to the ED. In the ED, patient's vitals are stable yet given patient's risk factors and T wave changes, hospitalist is requested to admit for chest pain observation.        * No surgery found *       Hospital Course:   Precautions.  Patient is a 72-year-old female who presented with chest pain.  She says that she had shoulder pain and rolled over during the night and her pain radiated to her chest and prompted her to come to the ED.  She was admitted for further evaluation.  Cardiac enzymes were negative and ACS was ruled out.  The patient underwent stress test for further evaluation and stress test was negative.  There were no signs of inducible ischemia.  She is not had any further chest pain at this time.  She has a cardiologist Dr. Huffman and will follow-up with him within a month.  She will also follow with her primary physician within 1 week.  She was discharged in good condition with plans for close outpatient follow-up.  I reviewed the discharge plan of care and instructions with the patient on the  day of discharge.  She was given strict return precautions.           Review of patient's allergies indicates:  No Known Allergies    Past Medical History:   Diagnosis Date    Back pain     Bruxism, sleep-related     wears appliance    H/O total hip arthroplasty, right     Hypercholesterolemia     Hypertension     Obesity     Primary osteoarthritis of knees, bilateral     Primary osteoarthritis of right knee 11/2018    Urinary incontinence      Past Surgical History:   Procedure Laterality Date    ANKLE SURGERY Right     EYE SURGERY      HIP ARTHROPLASTY      8/18/15 right JACKELYN /J&J Depuy press fit    HIP SURGERY Left 03/2022    JOINT REPLACEMENT Right 12/12/2018    TKA    LASIK Bilateral     TOTAL KNEE ARTHROPLASTY Right 12/12/2018    Procedure: ARTHROPLASTY, KNEE, TOTAL;  Surgeon: Malik Herzog MD;  Location: [facility];  Service: Orthopedics;  Laterality: Right;    TUBAL LIGATION       Social History     Tobacco Use    Smoking status: Never    Smokeless tobacco: Never   Substance Use Topics    Alcohol use: Yes     Alcohol/week: 5.0 standard drinks     Types: 5 Glasses of wine per week    Drug use: No        Review of Systems:    As noted in HPI in addition      REVIEW OF SYSTEMS  CARDIOVASCULAR: No recent chest pain, palpitations, arm, neck, or jaw pain  RESPIRATORY: No recent fever, cough chills, SOB or congestion  : No blood in the urine  GI: No Nausea, vomiting, constipation, diarrhea, blood, or reflux.  MUSCULOSKELETAL: No myalgias  NEURO: No lightheadedness or dizziness  EYES: No Double vision, blurry, vision or headache              Objective        Vitals:    03/14/23 1534   BP: 130/80   Pulse: 79   Resp: 16       LIPIDS - LAST 2   Lab Results   Component Value Date    CHOL 201 (H) 03/09/2023    CHOL 183 02/11/2022    HDL 86 03/09/2023    HDL 83 02/11/2022    LDLCALC 98 03/09/2023    LDLCALC 84 02/11/2022    TRIG 79 03/09/2023    TRIG 73 02/11/2022    CHOLHDL 2.3 03/09/2023    CHOLHDL 2.2 02/11/2022        CBC - LAST 2  Lab Results   Component Value Date    WBC 4.6 03/09/2023    WBC 4.60 02/08/2023    RBC 4.22 03/09/2023    RBC 4.07 02/08/2023    HGB 12.6 03/09/2023    HGB 12.4 02/08/2023    HCT 38.3 03/09/2023    HCT 37.9 02/08/2023    MCV 90.8 03/09/2023    MCV 93 02/08/2023    MCH 29.9 03/09/2023    MCH 30.5 02/08/2023    MCHC 32.9 03/09/2023    MCHC 32.7 02/08/2023    RDW 12.9 03/09/2023    RDW 13.6 02/08/2023     03/09/2023     02/08/2023    MPV 10.2 03/09/2023    MPV 9.4 02/08/2023    GRAN 3.0 02/08/2023    GRAN 65.0 02/08/2023    LYMPH 1,343 03/09/2023    LYMPH 29.2 03/09/2023    MONO 322 03/09/2023    MONO 7.0 03/09/2023    BASO 41 03/09/2023    BASO 0.05 02/08/2023    NRBC 0 02/08/2023    NRBC 0 11/29/2018       CHEMISTRY & LIVER FUNCTION - LAST 2  Lab Results   Component Value Date     03/09/2023     02/08/2023    K 4.7 03/09/2023    K 4.1 02/08/2023     03/09/2023     02/08/2023    CO2 27 03/09/2023    CO2 24 02/08/2023    ANIONGAP 10 02/08/2023    ANIONGAP 10 11/29/2018    BUN 20 03/09/2023    BUN 20 02/08/2023    CREATININE 0.88 03/09/2023    CREATININE 1.0 02/08/2023     (H) 03/09/2023    GLU 88 02/08/2023    CALCIUM 9.6 03/09/2023    CALCIUM 9.3 02/08/2023    MG 1.7 02/08/2023    MG 2.1 04/13/2021    ALBUMIN 3.9 03/09/2023    ALBUMIN 4.2 02/08/2023    PROT 6.5 03/09/2023    PROT 6.8 02/08/2023    ALKPHOS 68 02/08/2023    ALKPHOS 62 11/29/2018    ALT 9 03/09/2023    ALT 13 02/08/2023    AST 15 03/09/2023    AST 20 02/08/2023    BILITOT 0.5 03/09/2023    BILITOT 0.6 02/08/2023        CARDIAC PROFILE - LAST 2  Lab Results   Component Value Date    BNP 36 02/08/2023     02/08/2023    TROPONINIHS 3.7 02/08/2023    TROPONINIHS 2.9 02/08/2023        COAGULATION - LAST 2  Lab Results   Component Value Date    LABPT 12.6 11/29/2018    LABPT 12.2 08/04/2015    INR 1.0 02/08/2023    INR 1.0 11/29/2018    APTT 29.5 08/04/2015       ENDOCRINE & PSA - LAST  2  Lab Results   Component Value Date    HGBA1C 5.0 04/13/2021    TSH 2.480 02/08/2023    TSH 3.83 02/11/2022        ECHOCARDIOGRAM RESULTS  Results for orders placed during the hospital encounter of 02/08/23    Echo    Interpretation Summary  · The left ventricle is normal in size with mild concentric hypertrophy and normal systolic function.  · The estimated ejection fraction is 55%.  · Normal left ventricular diastolic function.  · Normal right ventricular size with normal right ventricular systolic function.      CURRENT/PREVIOUS VISIT EKG  Results for orders placed or performed during the hospital encounter of 02/08/23   EKG 12-lead    Collection Time: 02/08/23 10:34 AM    Narrative    Test Reason : R07.9,    Vent. Rate : 079 BPM     Atrial Rate : 079 BPM     P-R Int : 160 ms          QRS Dur : 072 ms      QT Int : 368 ms       P-R-T Axes : 035 022 053 degrees     QTc Int : 421 ms    Normal sinus rhythm  Normal ECG  When compared with ECG of 22-AUG-2022 14:16,  No significant change was found  Confirmed by Darryn MORRIS, Kb GUNTER ([id number]) on 2/14/2023 12:28:16 PM    Referred By: [email or web address]   SELF           Confirmed By:Kb Cates MD     No valid procedures specified.   Results for orders placed during the hospital encounter of 02/08/23    Nuclear Stress Test    Interpretation Summary    The ECG portion of the study is negative for ischemia.    The patient reported no chest pain during the stress test.    During stress, rare PVCs are noted.    The nuclear portion of this study will be reported separately.    No valid procedures specified.    PHYSICAL EXAM  CONSTITUTIONAL: Well built, well nourished in no apparent distress  NECK: no carotid bruit, no JVD  LUNGS: CTA  CHEST WALL: no tenderness  HEART: regular rate and rhythm, S1, S2 normal, no murmur, click, rub or gallop   ABDOMEN: soft, non-tender; bowel sounds normal  EXTREMITIES: Extremities normal, no edema, no calf tenderness noted  NEURO: AAO X 3    I HAVE  REVIEWED :    The vital signs, nurses notes, and all the pertinent radiology and labs.        Current Outpatient Medications   Medication Instructions    amLODIPine (NORVASC) 5 MG tablet TAKE 1 TABLET DAILY    aspirin (ECOTRIN) 81 mg, Oral, Daily    b complex vitamins tablet 1 tablet, Oral, Daily    CALCIUM CARBONATE (CALCIUM 300 ORAL) 1,200 mg, Oral, Daily, pt is staying with daughter left supplements at home she states she will resume them when she returns    clotrimazole-betamethasone 1-0.05% (LOTRISONE) cream 1 g, Topical (Top), As needed (PRN)    ferrous sulfate (FEOSOL) 325 mg, Oral, With breakfast    flaxseed 1,000 mg Cap 1 capsule, Oral, Daily, pt is staying with daughter left supplements at home she states she will resume them when she returns    HYDROcodone-acetaminophen (NORCO)  mg per tablet 1 tablet, Oral, Every 12 hours PRN    MAGNESIUM ORAL 500 mg, Oral, Nightly, pt is staying with daughter left supplements at home she states she will resume them when she returns    multivitamin (THERAGRAN) per tablet 1 tablet, Oral, Daily, pt is staying with daughter left supplements at home she states she will resume them when she returns    oxybutynin (DITROPAN) 5 mg, Oral, Daily    pravastatin (PRAVACHOL) 20 mg, Oral, Nightly          Assessment & Plan     Essential hypertension  Blood pressure today in office is 130/80.  Continue current medication regimen.  Continue to check blood pressure at home.  Recommend low-sodium heart healthy diet.  Continue amlodipine 5 mg daily.    Hyperlipidemia    Patient's last LDL was 98 on 03/09/2023.  Continue pravastatin 20 mg daily.  Continue risk factor modification.  Recommend low-sodium heart healthy diet.  Reduce saturated fats and meat intake.  Increase green leafy vegetables.  Exercise as tolerated.  Encourage weight loss.     Latest Reference Range & Units Most Recent   Cholesterol <200 mg/dL 201 (H)  3/9/23 07:19   HDL > OR = 50 mg/dL 86  3/9/23 07:19    HDL/Cholesterol Ratio <5.0 (calc) 2.3  3/9/23 07:19   LDL Cholesterol External mg/dL (calc) 98  3/9/23 07:19   Non HDL Chol. (LDL+VLDL) <130 mg/dL (calc) 115  3/9/23 07:19   Triglycerides <150 mg/dL 79  3/9/23 07:19   (H): Data is abnormally high    Obesity (BMI 30.0-34.9)  Patient's current BMI is 28.35.  Continue low-sodium heart healthy diet.  Reduce saturated fats.  Encourage weight loss.  Exercise as tolerated.    Atypical chest pain  Patient was recently in hospital for atypical chest pain.  Stress test was negative.  Negative cardiac enzymes.  EKG showed no acute STT wave changes.  Patient has not had any further chest pain since discharge.  Continue current medication regimen.    Patient follow up in 6 months.          No follow-ups on file.

## 2023-03-14 NOTE — ASSESSMENT & PLAN NOTE
Patient's current BMI is 28.35.  Continue low-sodium heart healthy diet.  Reduce saturated fats.  Encourage weight loss.  Exercise as tolerated.

## 2023-03-14 NOTE — ASSESSMENT & PLAN NOTE
Blood pressure today in office is 130/80.  Continue current medication regimen.  Continue to check blood pressure at home.  Recommend low-sodium heart healthy diet.  Continue amlodipine 5 mg daily.

## 2023-03-14 NOTE — ASSESSMENT & PLAN NOTE
Patient's last LDL was 98 on 03/09/2023.  Continue pravastatin 20 mg daily.  Continue risk factor modification.  Recommend low-sodium heart healthy diet.  Reduce saturated fats and meat intake.  Increase green leafy vegetables.  Exercise as tolerated.  Encourage weight loss.     Latest Reference Range & Units Most Recent   Cholesterol <200 mg/dL 201 (H)  3/9/23 07:19   HDL > OR = 50 mg/dL 86  3/9/23 07:19   HDL/Cholesterol Ratio <5.0 (calc) 2.3  3/9/23 07:19   LDL Cholesterol External mg/dL (calc) 98  3/9/23 07:19   Non HDL Chol. (LDL+VLDL) <130 mg/dL (calc) 115  3/9/23 07:19   Triglycerides <150 mg/dL 79  3/9/23 07:19   (H): Data is abnormally high

## 2023-03-31 ENCOUNTER — HOSPITAL ENCOUNTER (OUTPATIENT)
Dept: RADIOLOGY | Facility: HOSPITAL | Age: 73
Discharge: HOME | End: 2023-03-31
Attending: ORTHOPAEDIC SURGERY
Payer: MEDICARE

## 2023-03-31 DIAGNOSIS — M25.559 PAIN IN UNSPECIFIED HIP: ICD-10-CM

## 2023-03-31 DIAGNOSIS — Z96.642 PRESENCE OF LEFT ARTIFICIAL HIP JOINT: ICD-10-CM

## 2023-03-31 PROCEDURE — 73721 MRI JNT OF LWR EXTRE W/O DYE: CPT | Mod: TC,PO,LT

## 2023-04-20 ENCOUNTER — PES CALL (OUTPATIENT)
Dept: ADMINISTRATIVE | Facility: CLINIC | Age: 73
End: 2023-04-20
Payer: MEDICARE

## 2023-04-24 ENCOUNTER — HOSPITAL ENCOUNTER (OUTPATIENT)
Dept: RADIOLOGY | Facility: CLINIC | Age: 73
Discharge: HOME | End: 2023-04-24
Attending: SPECIALIST
Payer: MEDICARE

## 2023-04-24 DIAGNOSIS — Z78.0 MENOPAUSE: ICD-10-CM

## 2023-04-24 DIAGNOSIS — Z12.31 VISIT FOR SCREENING MAMMOGRAM: ICD-10-CM

## 2023-04-24 PROCEDURE — 77080 DEXA BONE DENSITY SPINE HIP: ICD-10-PCS | Mod: 26,,, | Performed by: RADIOLOGY

## 2023-04-24 PROCEDURE — 77080 DXA BONE DENSITY AXIAL: CPT | Mod: TC,PO

## 2023-04-24 PROCEDURE — 77080 DXA BONE DENSITY AXIAL: CPT | Mod: 26,,, | Performed by: RADIOLOGY

## 2023-05-15 RX ORDER — AMLODIPINE BESYLATE 5 MG/1
TABLET ORAL
Qty: 90 TABLET | Refills: 3 | Status: SHIPPED | OUTPATIENT
Start: 2023-05-15

## 2023-05-31 RX ORDER — PRAVASTATIN SODIUM 20 MG/1
TABLET ORAL
Qty: 90 TABLET | Refills: 3 | Status: SHIPPED | OUTPATIENT
Start: 2023-05-31 | End: 2023-11-09 | Stop reason: SDUPTHER

## 2023-06-12 ENCOUNTER — TELEPHONE (OUTPATIENT)
Dept: FAMILY MEDICINE | Facility: CLINIC | Age: 73
End: 2023-06-12

## 2023-06-12 NOTE — TELEPHONE ENCOUNTER
----- Message from Elizabeth Iniguez MA sent at 6/12/2023 11:38 AM CDT -----  Regarding: surgery clearance  Pt calling for a surgery clearance appt and states the surgery is 6/26. And sees the dr on 6/22. [phone]

## 2023-06-13 ENCOUNTER — OFFICE VISIT (OUTPATIENT)
Dept: CARDIOLOGY | Facility: CLINIC | Age: 73
End: 2023-06-13
Payer: MEDICARE

## 2023-06-13 VITALS
OXYGEN SATURATION: 97 % | SYSTOLIC BLOOD PRESSURE: 136 MMHG | DIASTOLIC BLOOD PRESSURE: 78 MMHG | HEART RATE: 66 BPM | BODY MASS INDEX: 28.41 KG/M2 | HEIGHT: 67 IN | WEIGHT: 181 LBS

## 2023-06-13 DIAGNOSIS — I10 ESSENTIAL HYPERTENSION: Primary | ICD-10-CM

## 2023-06-13 DIAGNOSIS — Z01.818 PREOPERATIVE CLEARANCE: ICD-10-CM

## 2023-06-13 DIAGNOSIS — M17.11 PRIMARY OSTEOARTHRITIS OF RIGHT KNEE: ICD-10-CM

## 2023-06-13 DIAGNOSIS — E78.2 MIXED HYPERLIPIDEMIA: ICD-10-CM

## 2023-06-13 PROCEDURE — 3075F SYST BP GE 130 - 139MM HG: CPT | Mod: CPTII,S$GLB,, | Performed by: NURSE PRACTITIONER

## 2023-06-13 PROCEDURE — 1159F MED LIST DOCD IN RCRD: CPT | Mod: CPTII,S$GLB,, | Performed by: NURSE PRACTITIONER

## 2023-06-13 PROCEDURE — 99999 PR PBB SHADOW E&M-EST. PATIENT-LVL III: CPT | Mod: PBBFAC,,, | Performed by: NURSE PRACTITIONER

## 2023-06-13 PROCEDURE — 99214 OFFICE O/P EST MOD 30 MIN: CPT | Mod: S$GLB,,, | Performed by: NURSE PRACTITIONER

## 2023-06-13 PROCEDURE — 93000 EKG 12-LEAD: ICD-10-PCS | Mod: S$GLB,,, | Performed by: INTERNAL MEDICINE

## 2023-06-13 PROCEDURE — 3008F BODY MASS INDEX DOCD: CPT | Mod: CPTII,S$GLB,, | Performed by: NURSE PRACTITIONER

## 2023-06-13 PROCEDURE — 99214 PR OFFICE/OUTPT VISIT, EST, LEVL IV, 30-39 MIN: ICD-10-PCS | Mod: S$GLB,,, | Performed by: NURSE PRACTITIONER

## 2023-06-13 PROCEDURE — 99999 PR PBB SHADOW E&M-EST. PATIENT-LVL III: ICD-10-PCS | Mod: PBBFAC,,, | Performed by: NURSE PRACTITIONER

## 2023-06-13 PROCEDURE — 3078F DIAST BP <80 MM HG: CPT | Mod: CPTII,S$GLB,, | Performed by: NURSE PRACTITIONER

## 2023-06-13 PROCEDURE — 3078F PR MOST RECENT DIASTOLIC BLOOD PRESSURE < 80 MM HG: ICD-10-PCS | Mod: CPTII,S$GLB,, | Performed by: NURSE PRACTITIONER

## 2023-06-13 PROCEDURE — 3008F PR BODY MASS INDEX (BMI) DOCUMENTED: ICD-10-PCS | Mod: CPTII,S$GLB,, | Performed by: NURSE PRACTITIONER

## 2023-06-13 PROCEDURE — 1101F PR PT FALLS ASSESS DOC 0-1 FALLS W/OUT INJ PAST YR: ICD-10-PCS | Mod: CPTII,S$GLB,, | Performed by: NURSE PRACTITIONER

## 2023-06-13 PROCEDURE — 1126F PR PAIN SEVERITY QUANTIFIED, NO PAIN PRESENT: ICD-10-PCS | Mod: CPTII,S$GLB,, | Performed by: NURSE PRACTITIONER

## 2023-06-13 PROCEDURE — 1126F AMNT PAIN NOTED NONE PRSNT: CPT | Mod: CPTII,S$GLB,, | Performed by: NURSE PRACTITIONER

## 2023-06-13 PROCEDURE — 3288F FALL RISK ASSESSMENT DOCD: CPT | Mod: CPTII,S$GLB,, | Performed by: NURSE PRACTITIONER

## 2023-06-13 PROCEDURE — 1101F PT FALLS ASSESS-DOCD LE1/YR: CPT | Mod: CPTII,S$GLB,, | Performed by: NURSE PRACTITIONER

## 2023-06-13 PROCEDURE — 3288F PR FALLS RISK ASSESSMENT DOCUMENTED: ICD-10-PCS | Mod: CPTII,S$GLB,, | Performed by: NURSE PRACTITIONER

## 2023-06-13 PROCEDURE — 93000 ELECTROCARDIOGRAM COMPLETE: CPT | Mod: S$GLB,,, | Performed by: INTERNAL MEDICINE

## 2023-06-13 PROCEDURE — 3075F PR MOST RECENT SYSTOLIC BLOOD PRESS GE 130-139MM HG: ICD-10-PCS | Mod: CPTII,S$GLB,, | Performed by: NURSE PRACTITIONER

## 2023-06-13 PROCEDURE — 1159F PR MEDICATION LIST DOCUMENTED IN MEDICAL RECORD: ICD-10-PCS | Mod: CPTII,S$GLB,, | Performed by: NURSE PRACTITIONER

## 2023-06-13 NOTE — PROGRESS NOTES
Subjective:    Patient ID:  Lorraine Moreno is a 73 y.o. female patient here for evaluation Follow-up      History of Present Illness:       Ms. Moreno is here for her follow up visit and a surgical clearance.  She had a stress test and ECHO in February, both within normal limits.  She is having Muscle replacement surgery with Dr. Sanders on June 26th.  She denies CP or SOB. No arm, neck or jaw pain.   Denies lightheadedness or dizziness.   No double vision. No recent fever or chills. No palpitations.   EKG today WNL.      Review of patient's allergies indicates:  No Known Allergies    Past Medical History:   Diagnosis Date    Back pain     Bruxism, sleep-related     wears appliance    H/O total hip arthroplasty, right     Hypercholesterolemia     Hypertension     Obesity     Primary osteoarthritis of knees, bilateral     Primary osteoarthritis of right knee 11/2018    Urinary incontinence      Past Surgical History:   Procedure Laterality Date    ANKLE SURGERY Right     EYE SURGERY      HIP ARTHROPLASTY      8/18/15 right JACKELYN /J&J Depuy press fit    HIP SURGERY Left 03/2022    JOINT REPLACEMENT Right 12/12/2018    TKA    LASIK Bilateral     TOTAL KNEE ARTHROPLASTY Right 12/12/2018    Procedure: ARTHROPLASTY, KNEE, TOTAL;  Surgeon: Malik Herzog MD;  Location: [facility];  Service: Orthopedics;  Laterality: Right;    TUBAL LIGATION       Social History     Tobacco Use    Smoking status: Never    Smokeless tobacco: Never   Substance Use Topics    Alcohol use: Yes     Alcohol/week: 5.0 standard drinks     Types: 5 Glasses of wine per week    Drug use: No        Review of Systems:    As noted in HPI in addition              Objective        Vitals:    06/13/23 0803   BP: 136/78   Pulse: 66       LIPIDS - LAST 2   Lab Results   Component Value Date    CHOL 201 (H) 03/09/2023    CHOL 183 02/11/2022    HDL 86 03/09/2023    HDL 83 02/11/2022    LDLCALC 98 03/09/2023    LDLCALC 84 02/11/2022    TRIG 79 03/09/2023    TRIG 73  02/11/2022    CHOLHDL 2.3 03/09/2023    CHOLHDL 2.2 02/11/2022       CBC - LAST 2  Lab Results   Component Value Date    WBC 4.6 03/09/2023    WBC 4.60 02/08/2023    RBC 4.22 03/09/2023    RBC 4.07 02/08/2023    HGB 12.6 03/09/2023    HGB 12.4 02/08/2023    HCT 38.3 03/09/2023    HCT 37.9 02/08/2023    MCV 90.8 03/09/2023    MCV 93 02/08/2023    MCH 29.9 03/09/2023    MCH 30.5 02/08/2023    MCHC 32.9 03/09/2023    MCHC 32.7 02/08/2023    RDW 12.9 03/09/2023    RDW 13.6 02/08/2023     03/09/2023     02/08/2023    MPV 10.2 03/09/2023    MPV 9.4 02/08/2023    GRAN 3.0 02/08/2023    GRAN 65.0 02/08/2023    LYMPH 1,343 03/09/2023    LYMPH 29.2 03/09/2023    MONO 322 03/09/2023    MONO 7.0 03/09/2023    BASO 41 03/09/2023    BASO 0.05 02/08/2023    NRBC 0 02/08/2023    NRBC 0 11/29/2018       CHEMISTRY & LIVER FUNCTION - LAST 2  Lab Results   Component Value Date     03/09/2023     02/08/2023    K 4.7 03/09/2023    K 4.1 02/08/2023     03/09/2023     02/08/2023    CO2 27 03/09/2023    CO2 24 02/08/2023    ANIONGAP 10 02/08/2023    ANIONGAP 10 11/29/2018    BUN 20 03/09/2023    BUN 20 02/08/2023    CREATININE 0.88 03/09/2023    CREATININE 1.0 02/08/2023     (H) 03/09/2023    GLU 88 02/08/2023    CALCIUM 9.6 03/09/2023    CALCIUM 9.3 02/08/2023    MG 1.7 02/08/2023    MG 2.1 04/13/2021    ALBUMIN 3.9 03/09/2023    ALBUMIN 4.2 02/08/2023    PROT 6.5 03/09/2023    PROT 6.8 02/08/2023    ALKPHOS 68 02/08/2023    ALKPHOS 62 11/29/2018    ALT 9 03/09/2023    ALT 13 02/08/2023    AST 15 03/09/2023    AST 20 02/08/2023    BILITOT 0.5 03/09/2023    BILITOT 0.6 02/08/2023        CARDIAC PROFILE - LAST 2  Lab Results   Component Value Date    BNP 36 02/08/2023     02/08/2023    TROPONINIHS 3.7 02/08/2023    TROPONINIHS 2.9 02/08/2023        COAGULATION - LAST 2  Lab Results   Component Value Date    LABPT 12.6 11/29/2018    LABPT 12.2 08/04/2015    INR 1.0 02/08/2023    INR 1.0  11/29/2018    APTT 29.5 08/04/2015       ENDOCRINE & PSA - LAST 2  Lab Results   Component Value Date    HGBA1C 5.0 04/13/2021    TSH 2.480 02/08/2023    TSH 3.83 02/11/2022        ECHOCARDIOGRAM RESULTS  Results for orders placed during the hospital encounter of 02/08/23    Echo    Interpretation Summary  · The left ventricle is normal in size with mild concentric hypertrophy and normal systolic function.  · The estimated ejection fraction is 55%.  · Normal left ventricular diastolic function.  · Normal right ventricular size with normal right ventricular systolic function.      CURRENT/PREVIOUS VISIT EKG  Results for orders placed or performed during the hospital encounter of 02/08/23   EKG 12-lead    Collection Time: 02/08/23 10:34 AM    Narrative    Test Reason : R07.9,    Vent. Rate : 079 BPM     Atrial Rate : 079 BPM     P-R Int : 160 ms          QRS Dur : 072 ms      QT Int : 368 ms       P-R-T Axes : 035 022 053 degrees     QTc Int : 421 ms    Normal sinus rhythm  Normal ECG  When compared with ECG of 22-AUG-2022 14:16,  No significant change was found  Confirmed by Darryn MORRIS, Kb GUNTER ([id number]) on 2/14/2023 12:28:16 PM    Referred By: [email or web address]   SELF           Confirmed By:Kb Cates MD     No valid procedures specified.   Results for orders placed during the hospital encounter of 02/08/23    Nuclear Stress Test    Interpretation Summary    The ECG portion of the study is negative for ischemia.    The patient reported no chest pain during the stress test.    During stress, rare PVCs are noted.    The nuclear portion of this study will be reported separately.    IMPRESSION:    1. No scintigraphic evidence of reversible myocardial ischemia.    2. Estimated ejection fraction is 68%    PHYSICAL EXAM  CONSTITUTIONAL: Well built, well nourished in no apparent distress  NECK: no carotid bruit, no JVD  LUNGS: CTA  CHEST WALL: no tenderness  HEART: regular rate and rhythm, S1, S2 normal, no murmur, click, rub or  gallop   ABDOMEN: soft, non-tender; bowel sounds normal; no masses,  no organomegaly  EXTREMITIES: Extremities normal, no edema, no calf tenderness noted  NEURO: AAO X 3    I HAVE REVIEWED :    The vital signs, nurses notes, and all the pertinent radiology and labs.        Current Outpatient Medications   Medication Instructions    amLODIPine (NORVASC) 5 MG tablet TAKE 1 TABLET DAILY    aspirin (ECOTRIN) 81 mg, Oral, Daily    b complex vitamins tablet 1 tablet, Oral, Daily    CALCIUM CARBONATE (CALCIUM 300 ORAL) 1,200 mg, Oral, Daily, pt is staying with daughter left supplements at home she states she will resume them when she returns    clotrimazole-betamethasone 1-0.05% (LOTRISONE) cream 1 g, Topical (Top), As needed (PRN)    flaxseed 1,000 mg Cap 1 capsule, Oral, Daily, pt is staying with daughter left supplements at home she states she will resume them when she returns    multivitamin (THERAGRAN) per tablet 1 tablet, Oral, Daily, pt is staying with daughter left supplements at home she states she will resume them when she returns    mv-mn/om3/dha/epa/fish/lut/kyle (OCUVITE ADULT 50 PLUS ORAL) Oral    oxybutynin (DITROPAN) 5 mg, Oral, Daily    pravastatin (PRAVACHOL) 20 MG tablet TAKE 1 TABLET EVERY EVENING          Assessment & Plan     Mixed hyperlipidemia    Patient's last LDL was 98 on 03/09/2023.  Continue pravastatin 20 mg daily.  Continue risk factor modification.  Recommend low-sodium heart healthy diet.  Reduce saturated fats and meat intake.  Increase green leafy vegetables.  Exercise as tolerated.  Encourage weight loss.      Latest Reference Range & Units Most Recent   Cholesterol <200 mg/dL 201 (H)  3/9/23 07:19   HDL > OR = 50 mg/dL 86  3/9/23 07:19   HDL/Cholesterol Ratio <5.0 (calc) 2.3  3/9/23 07:19   LDL Cholesterol External mg/dL (calc) 98  3/9/23 07:19   Non HDL Chol. (LDL+VLDL) <130 mg/dL (calc) 115  3/9/23 07:19   Triglycerides <150 mg/dL 79  3/9/23 07:19     Will recheck in the next 6 months  time    Primary osteoarthritis of right knee  Uses walker to ambulate currently  Doing well overall    Essential hypertension  BP today is 136/78  Recommend low salt heart healthy diet  Continue to spot check BP at home  Continue amlodipine 5 mg daily    Preoperative clearance  Recent Stress and ECHO WNL  EKG WNL  No anginal symptoms  Patient an acceptable cardiovascular risk for the planned procedure          No follow-ups on file.

## 2023-06-13 NOTE — ASSESSMENT & PLAN NOTE
BP today is 136/78  Recommend low salt heart healthy diet  Continue to spot check BP at home  Continue amlodipine 5 mg daily

## 2023-06-13 NOTE — LETTER
2023    Lorraine Moreno  [address] [place] [address] [address] [place] [id number]             [facility]  [address]  [place] [address]  Phone: [phone]  Fax: [phone] Patient: Lorraine Moreno  : 1950  Referring Doctor: Dr. Mcmahon  Procedure: Muscle Reattachment   Current Outpatient Medications   Medication Sig    amLODIPine (NORVASC) 5 MG tablet TAKE 1 TABLET DAILY    aspirin (ECOTRIN) 81 MG EC tablet Take 1 tablet (81 mg total) by mouth once daily.    b complex vitamins tablet Take 1 tablet by mouth once daily.    CALCIUM CARBONATE (CALCIUM 300 ORAL) Take 1,200 mg by mouth once daily. pt is staying with daughter left supplements at home she states she will resume them when she returns    clotrimazole-betamethasone 1-0.05% (LOTRISONE) cream Apply 1 g topically as needed.    flaxseed 1,000 mg Cap Take 1 capsule by mouth once daily. pt is staying with daughter left supplements at home she states she will resume them when she returns    multivitamin (THERAGRAN) per tablet Take 1 tablet by mouth once daily. pt is staying with daughter left supplements at home she states she will resume them when she returns    mv-mn/om3/dha/epa/fish/lut/kyle (OCUVITE ADULT 50 PLUS ORAL) Take by mouth.    oxybutynin (DITROPAN) 5 MG Tab Take 5 mg by mouth once daily.     pravastatin (PRAVACHOL) 20 MG tablet TAKE 1 TABLET EVERY EVENING     No current facility-administered medications for this visit.       This patient has been assessed for risk factors for clearance of surgery with the following stipulations:    __x_ No contraindications  _x__ Recommendations for hold ASPIRIN x __7_ days   _x__ Cleared for surgery with moderate risks.  ___ Not cleared for surgery due to the following reasons:      If you have any questions regarding the above, please contact my office at [phone].    Sincerely,    Jessi Yen NP

## 2023-06-13 NOTE — ASSESSMENT & PLAN NOTE
Recent Stress and ECHO WNL  EKG WNL  No anginal symptoms  Patient an acceptable cardiovascular risk for the planned procedure

## 2023-06-13 NOTE — ASSESSMENT & PLAN NOTE
Patient's last LDL was 98 on 03/09/2023.  Continue pravastatin 20 mg daily.  Continue risk factor modification.  Recommend low-sodium heart healthy diet.  Reduce saturated fats and meat intake.  Increase green leafy vegetables.  Exercise as tolerated.  Encourage weight loss.      Latest Reference Range & Units Most Recent   Cholesterol <200 mg/dL 201 (H)  3/9/23 07:19   HDL > OR = 50 mg/dL 86  3/9/23 07:19   HDL/Cholesterol Ratio <5.0 (calc) 2.3  3/9/23 07:19   LDL Cholesterol External mg/dL (calc) 98  3/9/23 07:19   Non HDL Chol. (LDL+VLDL) <130 mg/dL (calc) 115  3/9/23 07:19   Triglycerides <150 mg/dL 79  3/9/23 07:19     Will recheck in the next 6 months time

## 2023-06-14 ENCOUNTER — OFFICE VISIT (OUTPATIENT)
Dept: FAMILY MEDICINE | Facility: CLINIC | Age: 73
End: 2023-06-14
Payer: MEDICARE

## 2023-06-14 VITALS
OXYGEN SATURATION: 96 % | BODY MASS INDEX: 28.56 KG/M2 | HEIGHT: 67 IN | WEIGHT: 182 LBS | DIASTOLIC BLOOD PRESSURE: 80 MMHG | HEART RATE: 88 BPM | SYSTOLIC BLOOD PRESSURE: 126 MMHG

## 2023-06-14 DIAGNOSIS — N32.81 OVERACTIVE BLADDER: ICD-10-CM

## 2023-06-14 DIAGNOSIS — I10 ESSENTIAL HYPERTENSION: ICD-10-CM

## 2023-06-14 DIAGNOSIS — Z01.818 PREOPERATIVE CLEARANCE: Primary | ICD-10-CM

## 2023-06-14 DIAGNOSIS — M15.9 PRIMARY OSTEOARTHRITIS INVOLVING MULTIPLE JOINTS: ICD-10-CM

## 2023-06-14 PROCEDURE — 3288F FALL RISK ASSESSMENT DOCD: CPT | Mod: CPTII,S$GLB,, | Performed by: FAMILY MEDICINE

## 2023-06-14 PROCEDURE — 3079F DIAST BP 80-89 MM HG: CPT | Mod: CPTII,S$GLB,, | Performed by: FAMILY MEDICINE

## 2023-06-14 PROCEDURE — 1101F PR PT FALLS ASSESS DOC 0-1 FALLS W/OUT INJ PAST YR: ICD-10-PCS | Mod: CPTII,S$GLB,, | Performed by: FAMILY MEDICINE

## 2023-06-14 PROCEDURE — 99214 OFFICE O/P EST MOD 30 MIN: CPT | Mod: S$GLB,,, | Performed by: FAMILY MEDICINE

## 2023-06-14 PROCEDURE — 3074F SYST BP LT 130 MM HG: CPT | Mod: CPTII,S$GLB,, | Performed by: FAMILY MEDICINE

## 2023-06-14 PROCEDURE — 1101F PT FALLS ASSESS-DOCD LE1/YR: CPT | Mod: CPTII,S$GLB,, | Performed by: FAMILY MEDICINE

## 2023-06-14 PROCEDURE — 1159F MED LIST DOCD IN RCRD: CPT | Mod: CPTII,S$GLB,, | Performed by: FAMILY MEDICINE

## 2023-06-14 PROCEDURE — 3288F PR FALLS RISK ASSESSMENT DOCUMENTED: ICD-10-PCS | Mod: CPTII,S$GLB,, | Performed by: FAMILY MEDICINE

## 2023-06-14 PROCEDURE — 1160F PR REVIEW ALL MEDS BY PRESCRIBER/CLIN PHARMACIST DOCUMENTED: ICD-10-PCS | Mod: CPTII,S$GLB,, | Performed by: FAMILY MEDICINE

## 2023-06-14 PROCEDURE — 1160F RVW MEDS BY RX/DR IN RCRD: CPT | Mod: CPTII,S$GLB,, | Performed by: FAMILY MEDICINE

## 2023-06-14 PROCEDURE — 3008F BODY MASS INDEX DOCD: CPT | Mod: CPTII,S$GLB,, | Performed by: FAMILY MEDICINE

## 2023-06-14 PROCEDURE — 3079F PR MOST RECENT DIASTOLIC BLOOD PRESSURE 80-89 MM HG: ICD-10-PCS | Mod: CPTII,S$GLB,, | Performed by: FAMILY MEDICINE

## 2023-06-14 PROCEDURE — 99214 PR OFFICE/OUTPT VISIT, EST, LEVL IV, 30-39 MIN: ICD-10-PCS | Mod: S$GLB,,, | Performed by: FAMILY MEDICINE

## 2023-06-14 PROCEDURE — 1159F PR MEDICATION LIST DOCUMENTED IN MEDICAL RECORD: ICD-10-PCS | Mod: CPTII,S$GLB,, | Performed by: FAMILY MEDICINE

## 2023-06-14 PROCEDURE — 3008F PR BODY MASS INDEX (BMI) DOCUMENTED: ICD-10-PCS | Mod: CPTII,S$GLB,, | Performed by: FAMILY MEDICINE

## 2023-06-14 PROCEDURE — 3074F PR MOST RECENT SYSTOLIC BLOOD PRESSURE < 130 MM HG: ICD-10-PCS | Mod: CPTII,S$GLB,, | Performed by: FAMILY MEDICINE

## 2023-06-14 NOTE — PROGRESS NOTES
SUBJECTIVE:    Patient ID: Lorraine Moreno is a 73 y.o. female.    Chief Complaint: Pre-op Exam (Left leg muscle surgery, went over meds verbally// SW)    Pt here for surgery clearance on 6/26/23 by Dr. Errol Sanders in [place] for left hip open abductor tendon repair at [facility]. Will be wearing a hip brace for 6 weeks. Has to be non weight bearing for 6 weeks. This is after a hip replacement a few years ago.    PMHx of HTN, HLD, OAB, arthritis.      BP is doing ok today. Denies CP/SOB/HA. ([place]). Was cleared by Cardiology.    No longer taking iron. Continues to donate blood every 8 weeks.    Bladder is doing ok. Has to wear pads in the daytime. Takes oxybutinin which helps. Has tried another med that the insurance that did not help. Gets up 1-2 times a night.     Pt with hx arthritis.  Ankles are doing better since she no longer doing therapy.    Goes to the gym about 3 days a week, and does the elliptical as well.     -----------------------------------------------------  Mammogram 2/2022, nl DEXA ([facility]), Pap  Cscope, Hx polyps ([facility])  Ophtho-Dr. Self      Orders Only on 03/09/2023   Component Date Value Ref Range Status    Cholesterol 03/09/2023 201 (H)  <200 mg/dL Final    HDL 03/09/2023 86  > OR = 50 mg/dL Final    Triglycerides 03/09/2023 79  <150 mg/dL Final    LDL Cholesterol 03/09/2023 98  mg/dL (calc) Final    HDL/Cholesterol Ratio 03/09/2023 2.3  <5.0 (calc) Final    Non HDL Chol. (LDL+VLDL) 03/09/2023 115  <130 mg/dL (calc) Final    Glucose 03/09/2023 100 (H)  65 - 99 mg/dL Final    BUN 03/09/2023 20  7 - 25 mg/dL Final    Creatinine 03/09/2023 0.88  0.60 - 1.00 mg/dL Final    eGFR 03/09/2023 70  > OR = 60 mL/min/1.73m2 Final    BUN/Creatinine Ratio 03/09/2023 NOT APPLICABLE  6 - 22 (calc) Final    Sodium 03/09/2023 140  135 - 146 mmol/L Final    Potassium 03/09/2023 4.7  3.5 - 5.3 mmol/L Final    Chloride 03/09/2023 105  98 - 110 mmol/L Final    CO2 03/09/2023 27  20 - 32 mmol/L Final     Calcium 03/09/2023 9.6  8.6 - 10.4 mg/dL Final    Total Protein 03/09/2023 6.5  6.1 - 8.1 g/dL Final    Albumin 03/09/2023 3.9  3.6 - 5.1 g/dL Final    Globulin, Total 03/09/2023 2.6  1.9 - 3.7 g/dL (calc) Final    Albumin/Globulin Ratio 03/09/2023 1.5  1.0 - 2.5 (calc) Final    Total Bilirubin 03/09/2023 0.5  0.2 - 1.2 mg/dL Final    Alkaline Phosphatase 03/09/2023 65  37 - 153 U/L Final    AST 03/09/2023 15  10 - 35 U/L Final    ALT 03/09/2023 9  6 - 29 U/L Final    WBC 03/09/2023 4.6  3.8 - 10.8 Thousand/uL Final    RBC 03/09/2023 4.22  3.80 - 5.10 Million/uL Final    Hemoglobin 03/09/2023 12.6  11.7 - 15.5 g/dL Final    Hematocrit 03/09/2023 38.3  35.0 - 45.0 % Final    MCV 03/09/2023 90.8  80.0 - 100.0 fL Final    MCH 03/09/2023 29.9  27.0 - 33.0 pg Final    MCHC 03/09/2023 32.9  32.0 - 36.0 g/dL Final    RDW 03/09/2023 12.9  11.0 - 15.0 % Final    Platelets 03/09/2023 254  140 - 400 Thousand/uL Final    MPV 03/09/2023 10.2  7.5 - 12.5 fL Final    Neutrophils, Abs 03/09/2023 2,742  1,500 - 7,800 cells/uL Final    Lymph # 03/09/2023 1,343  850 - 3,900 cells/uL Final    Mono # 03/09/2023 322  200 - 950 cells/uL Final    Eos # 03/09/2023 152  15 - 500 cells/uL Final    Baso # 03/09/2023 41  0 - 200 cells/uL Final    Neutrophils Relative 03/09/2023 59.6  % Final    Lymph % 03/09/2023 29.2  % Final    Mono % 03/09/2023 7.0  % Final    Eosinophil % 03/09/2023 3.3  % Final    Basophil % 03/09/2023 0.9  % Final   Admission on 02/08/2023, Discharged on 02/09/2023   Component Date Value Ref Range Status    WBC 02/08/2023 4.60  3.90 - 12.70 K/uL Final    RBC 02/08/2023 4.07  4.00 - 5.40 M/uL Final    Hemoglobin 02/08/2023 12.4  12.0 - 16.0 g/dL Final    Hematocrit 02/08/2023 37.9  37.0 - 48.5 % Final    MCV 02/08/2023 93  82 - 98 fL Final    MCH 02/08/2023 30.5  27.0 - 31.0 pg Final    MCHC 02/08/2023 32.7  32.0 - 36.0 g/dL Final    RDW 02/08/2023 13.6  11.5 - 14.5 % Final    Platelets 02/08/2023 240  150 - 450 K/uL  Final    MPV 02/08/2023 9.4  9.2 - 12.9 fL Final    Immature Granulocytes 02/08/2023 0.0  0.0 - 0.5 % Final    Gran # (ANC) 02/08/2023 3.0  1.8 - 7.7 K/uL Final    Immature Grans (Abs) 02/08/2023 0.00  0.00 - 0.04 K/uL Final    Lymph # 02/08/2023 1.1  1.0 - 4.8 K/uL Final    Mono # 02/08/2023 0.4  0.3 - 1.0 K/uL Final    Eos # 02/08/2023 0.1  0.0 - 0.5 K/uL Final    Baso # 02/08/2023 0.05  0.00 - 0.20 K/uL Final    nRBC 02/08/2023 0  0 /100 WBC Final    Gran % 02/08/2023 65.0  38.0 - 73.0 % Final    Lymph % 02/08/2023 23.7  18.0 - 48.0 % Final    Mono % 02/08/2023 8.5  4.0 - 15.0 % Final    Eosinophil % 02/08/2023 1.7  0.0 - 8.0 % Final    Basophil % 02/08/2023 1.1  0.0 - 1.9 % Final    Differential Method 02/08/2023 Automated   Final    Sodium 02/08/2023 139  136 - 145 mmol/L Final    Potassium 02/08/2023 4.1  3.5 - 5.1 mmol/L Final    Chloride 02/08/2023 105  95 - 110 mmol/L Final    CO2 02/08/2023 24  23 - 29 mmol/L Final    Glucose 02/08/2023 88  70 - 110 mg/dL Final    BUN 02/08/2023 20  8 - 23 mg/dL Final    Creatinine 02/08/2023 1.0  0.5 - 1.4 mg/dL Final    Calcium 02/08/2023 9.3  8.7 - 10.5 mg/dL Final    Total Protein 02/08/2023 6.8  6.0 - 8.4 g/dL Final    Albumin 02/08/2023 4.2  3.5 - 5.2 g/dL Final    Total Bilirubin 02/08/2023 0.6  0.1 - 1.0 mg/dL Final    Alkaline Phosphatase 02/08/2023 68  55 - 135 U/L Final    AST 02/08/2023 20  10 - 40 U/L Final    ALT 02/08/2023 13  10 - 44 U/L Final    Anion Gap 02/08/2023 10  8 - 16 mmol/L Final    eGFR 02/08/2023 >60.0  >60 mL/min/1.73 m^2 Final    BNP 02/08/2023 36  0 - 99 pg/mL Final    Troponin I High Sensitivity 02/08/2023 3.0  0.0 - 14.9 pg/mL Final    Prothrombin Time 02/08/2023 10.4  9.0 - 12.5 sec Final    INR 02/08/2023 1.0  0.8 - 1.2 Final    Troponin I High Sensitivity 02/08/2023 2.9  0.0 - 14.9 pg/mL Final    Specimen UA 02/08/2023 Urine, Clean Catch   Final    Color, UA 02/08/2023 Yellow  Yellow, Straw, Alisha Final    Appearance, UA 02/08/2023  Clear  Clear Final    pH, UA 02/08/2023 8.0  5.0 - 8.0 Final    Specific Gravity, UA 02/08/2023 1.015  1.005 - 1.030 Final    Protein, UA 02/08/2023 Negative  Negative Final    Glucose, UA 02/08/2023 Negative  Negative Final    Ketones, UA 02/08/2023 Negative  Negative Final    Bilirubin (UA) 02/08/2023 Negative  Negative Final    Occult Blood UA 02/08/2023 Negative  Negative Final    Nitrite, UA 02/08/2023 Negative  Negative Final    Urobilinogen, UA 02/08/2023 Negative  Negative EU/dL Final    Leukocytes, UA 02/08/2023 3+ (A)  Negative Final    D-Dimer 02/08/2023 0.36  <0.50 mg/L FEU Final    Lipase 02/08/2023 33  4 - 60 U/L Final    CPK 02/08/2023 108  20 - 180 U/L Final    Magnesium 02/08/2023 1.7  1.6 - 2.6 mg/dL Final    TSH 02/08/2023 2.480  0.340 - 5.600 uIU/mL Final    SARS-CoV-2 RNA, Amplification, Qual 02/08/2023 Negative  Negative Final    WBC, UA 02/08/2023 5  0 - 5 /hpf Final    Microscopic Comment 02/08/2023 SEE COMMENT   Final    Troponin I High Sensitivity 02/08/2023 3.7  0.0 - 14.9 pg/mL Final    BSA 02/09/2023 1.95  m2 Final    TDI SEPTAL 02/09/2023 0.06  m/s Final    LV LATERAL E/E' RATIO 02/09/2023 10.17  m/s Final    LV SEPTAL E/E' RATIO 02/09/2023 10.17  m/s Final    Left Ventricular Outflow Tract Mary* 02/09/2023 0.62  cm/s Final    Left Ventricular Outflow Tract Mary* 02/09/2023 2.00  mmHg Final    Pulmonary Valve Mean Velocity 02/09/2023 0.53  m/s Final    TDI LATERAL 02/09/2023 0.06  m/s Final    PV PEAK VELOCITY 02/09/2023 0.77  cm/s Final    LVIDd 02/09/2023 4.10  3.5 - 6.0 cm Final    IVS 02/09/2023 1.09  0.6 - 1.1 cm Final    Posterior Wall 02/09/2023 1.12 (A)  0.6 - 1.1 cm Final    LVIDs 02/09/2023 3.67  2.1 - 4.0 cm Final    FS 02/09/2023 10  28 - 44 % Final    LV mass 02/09/2023 152.29  g Final    LA size 02/09/2023 2.90  cm Final    TAPSE 02/09/2023 1.94  cm Final    RV S' 02/09/2023 0.01  cm/s Final    Left Ventricle Relative Wall Thick* 02/09/2023 0.55  cm Final    AV mean gradient  02/09/2023 5  mmHg Final    AV valve area 02/09/2023 1.70  cm2 Final    AV Velocity Ratio 02/09/2023 0.65   Final    AV index (prosthetic) 02/09/2023 0.67   Final    MV mean gradient 02/09/2023 2  mmHg Final    MV valve area p 1/2 method 02/09/2023 1.63  cm2 Final    MV valve area by continuity eq 02/09/2023 1.95  cm2 Final    E/A ratio 02/09/2023 0.66   Final    Mean e' 02/09/2023 0.06  m/s Final    E wave deceleration time 02/09/2023 254.00  msec Final    LVOT diameter 02/09/2023 1.80  cm Final    LVOT area 02/09/2023 2.5  cm2 Final    LVOT peak emigdio 02/09/2023 0.97  m/s Final    LVOT peak VTI 02/09/2023 23.40  cm Final    Ao peak emigdio 02/09/2023 1.49  m/s Final    Ao VTI 02/09/2023 35.0  cm Final    LVOT stroke volume 02/09/2023 59.52  cm3 Final    AV peak gradient 02/09/2023 9  mmHg Final    MV peak gradient 02/09/2023 4  mmHg Final    E/E' ratio 02/09/2023 10.17  m/s Final    MV Peak E Emigdio 02/09/2023 0.61  m/s Final    TR Max Emigdio 02/09/2023 2.12  m/s Final    MV VTI 02/09/2023 30.5  cm Final    MV stenosis pressure 1/2 time 02/09/2023 135.00  ms Final    MV Peak A Emigdio 02/09/2023 0.93  m/s Final    LV Systolic Volume 02/09/2023 36.73  mL Final    LV Systolic Volume Index 02/09/2023 19.1  mL/m2 Final    LV Diastolic Volume 02/09/2023 74.22  mL Final    LV Diastolic Volume Index 02/09/2023 38.66  mL/m2 Final    LV Mass Index 02/09/2023 79  g/m2 Final    Triscuspid Valve Regurgitation Pea* 02/09/2023 18  mmHg Final    LA Volume Index (Mod) 02/09/2023 23.0  mL/m2 Final    LA volume (mod) 02/09/2023 44.10  cm3 Final    EF 02/09/2023 55  % Final    85% Max Predicted HR 02/09/2023 122   Final    Max Predicted HR 02/09/2023 144   Final    OHS CV CPX PATIENT IS MALE 02/09/2023 0.0   Final    OHS CV CPX PATIENT IS FEMALE 02/09/2023 1.0   Final    Systolic blood pressure 02/09/2023 145  mmHg Final    Diastolic blood pressure 02/09/2023 96  mmHg Final    HR at rest 02/09/2023 62  bpm Final    dose 02/09/2023 0.4  mg Final     Peak Systolic BP 02/09/2023 162  mmHg Final    Peak Diatolic BP 02/09/2023 104  mmHg Final    Peak HR 02/09/2023 105.0  bpm Final    % Max HR Achieved 02/09/2023 73   Final    1 Minute Recovery HR 02/09/2023 105  bpm Final    RPP 02/09/2023 8,990   Final    Peak RPP 02/09/2023 17,010   Final       Past Medical History:   Diagnosis Date    Back pain     Bruxism, sleep-related     wears appliance    H/O total hip arthroplasty, right     Hypercholesterolemia     Hypertension     Obesity     Primary osteoarthritis of knees, bilateral     Primary osteoarthritis of right knee 11/2018    Urinary incontinence      Social History     Socioeconomic History    Marital status:    Tobacco Use    Smoking status: Never    Smokeless tobacco: Never   Substance and Sexual Activity    Alcohol use: Yes     Alcohol/week: 5.0 standard drinks     Types: 5 Glasses of wine per week    Drug use: No    Sexual activity: Not Currently     Past Surgical History:   Procedure Laterality Date    ANKLE SURGERY Right     EYE SURGERY      HIP ARTHROPLASTY      8/18/15 right JACKELYN /[employer]J Depuy press fit    HIP SURGERY Left 03/2022    JOINT REPLACEMENT Right 12/12/2018    TKA    LASIK Bilateral     TOTAL KNEE ARTHROPLASTY Right 12/12/2018    Procedure: ARTHROPLASTY, KNEE, TOTAL;  Surgeon: Malik Herzog MD;  [facility];  Service: Orthopedics;  Laterality: Right;    TUBAL LIGATION       Family History   Problem Relation Age of Onset    No Known Problems Mother     Heart disease Father     Heart attack Father     Early death Father 55        heart attack    No Known Problems Brother     No Known Problems Brother     Psoriasis Neg Hx     Melanoma Neg Hx     Lupus Neg Hx     Eczema Neg Hx        Review of patient's allergies indicates:  No Known Allergies    Current Outpatient Medications:     amLODIPine (NORVASC) 5 MG tablet, TAKE 1 TABLET DAILY, Disp: 90 tablet, Rfl: 3    aspirin (ECOTRIN) 81 MG EC tablet, Take 1 tablet (81 mg total) by mouth  "once daily., Disp: 90 tablet, Rfl: 0    b complex vitamins tablet, Take 1 tablet by mouth once daily., Disp: , Rfl:     CALCIUM CARBONATE (CALCIUM 300 ORAL), Take 1,200 mg by mouth once daily. pt is staying with daughter left supplements at home she states she will resume them when she returns, Disp: , Rfl:     flaxseed 1,000 mg Cap, Take 1 capsule by mouth once daily. pt is staying with daughter left supplements at home she states she will resume them when she returns, Disp: , Rfl:     multivitamin (THERAGRAN) per tablet, Take 1 tablet by mouth once daily. pt is staying with daughter left supplements at home she states she will resume them when she returns, Disp: , Rfl:     mv-mn/om3/dha/epa/fish/lut/kyle (OCUVITE ADULT 50 PLUS ORAL), Take by mouth., Disp: , Rfl:     oxybutynin (DITROPAN) 5 MG Tab, Take 5 mg by mouth once daily. , Disp: , Rfl: 12    pravastatin (PRAVACHOL) 20 MG tablet, TAKE 1 TABLET EVERY EVENING, Disp: 90 tablet, Rfl: 3    Review of Systems   Constitutional:  Negative for appetite change, fatigue, fever and unexpected weight change.   Respiratory:  Negative for cough, chest tightness, shortness of breath and wheezing.    Cardiovascular:  Negative for chest pain and leg swelling.   Gastrointestinal:  Negative for abdominal pain, constipation, nausea and vomiting.        -heartburn   Genitourinary:  Negative for difficulty urinating, dysuria, frequency and urgency.   Musculoskeletal:  Negative for arthralgias, back pain, myalgias and neck pain.   Skin:  Negative for rash.   Neurological:  Negative for dizziness, weakness, numbness and headaches.   Hematological:  Does not bruise/bleed easily.   Psychiatric/Behavioral:  Negative for dysphoric mood, sleep disturbance and suicidal ideas. The patient is not nervous/anxious.    All other systems reviewed and are negative.       Objective:      Vitals:    06/14/23 1500   BP: 126/80   Pulse: 88   SpO2: 96%   Weight: 82.6 kg (182 lb)   Height: 5' 7" (1.702 " m)     Physical Exam  Vitals reviewed.   Constitutional:       General: She is not in acute distress.     Appearance: She is well-developed.   HENT:      Head: Normocephalic and atraumatic.   Neck:      Thyroid: No thyromegaly.      Vascular: No carotid bruit.   Cardiovascular:      Rate and Rhythm: Normal rate and regular rhythm.      Heart sounds: Normal heart sounds. No murmur heard.    No friction rub.   Pulmonary:      Effort: Pulmonary effort is normal.      Breath sounds: Normal breath sounds. No wheezing or rales.   Abdominal:      General: Bowel sounds are normal. There is no distension.      Palpations: Abdomen is soft.      Tenderness: There is no abdominal tenderness.   Musculoskeletal:      Cervical back: Neck supple.   Lymphadenopathy:      Cervical: No cervical adenopathy.   Skin:     General: Skin is warm and dry.      Findings: No rash.   Neurological:      Mental Status: She is alert and oriented to person, place, and time.   Psychiatric:         Attention and Perception: She is attentive.         Speech: Speech normal.         Behavior: Behavior normal.         Thought Content: Thought content normal.         Judgment: Judgment normal.         Assessment:       1. Preoperative clearance    2. Essential hypertension    3. Overactive bladder    4. Primary osteoarthritis involving multiple joints           Plan:       Preoperative clearance    Essential hypertension  Comments:  Controlled. Will continue to monitor BP on current medication regimen    Overactive bladder  Comments:  Stable. Will continue to monitor symptoms    Primary osteoarthritis involving multiple joints  Comments:  Chronic. Will continue to monitor symptoms      Labs and/or tests have been ordered for the evaluation/monitoring of acute/chronic conditions, to be done just before next visit.    Follow up in about 6 months (around 12/14/2023) for HTN, Arthritis.        6/14/2023 Parth Dalton

## 2023-06-21 ENCOUNTER — TELEPHONE (OUTPATIENT)
Dept: CARDIOLOGY | Facility: CLINIC | Age: 73
End: 2023-06-21
Payer: MEDICARE

## 2023-06-21 NOTE — TELEPHONE ENCOUNTER
----- Message from Darvin Katz Patient Care Assistant sent at 6/21/2023  1:12 PM CDT -----  Contact: Dr. Sanders  Type: Needs Medical Advice    Who Called:  Dr. Sanders   Best Call Back Number: [phone]  Fax: [phone]  Inquiry/Question:  Dr. Sanders  is calling to get a copy of the pt's cardiac clearance. Please advise. Thank you~

## 2023-10-19 ENCOUNTER — OFFICE VISIT (OUTPATIENT)
Dept: CARDIOLOGY | Facility: CLINIC | Age: 73
End: 2023-10-19
Payer: MEDICARE

## 2023-10-19 VITALS
SYSTOLIC BLOOD PRESSURE: 112 MMHG | BODY MASS INDEX: 29.19 KG/M2 | HEIGHT: 67 IN | OXYGEN SATURATION: 98 % | RESPIRATION RATE: 16 BRPM | WEIGHT: 186 LBS | HEART RATE: 67 BPM | DIASTOLIC BLOOD PRESSURE: 80 MMHG

## 2023-10-19 DIAGNOSIS — I10 ESSENTIAL HYPERTENSION: Primary | ICD-10-CM

## 2023-10-19 DIAGNOSIS — E66.9 OBESITY (BMI 30.0-34.9): ICD-10-CM

## 2023-10-19 DIAGNOSIS — E78.2 MIXED HYPERLIPIDEMIA: ICD-10-CM

## 2023-10-19 DIAGNOSIS — R94.31 NONSPECIFIC ABNORMAL ELECTROCARDIOGRAM (ECG) (EKG): ICD-10-CM

## 2023-10-19 DIAGNOSIS — M17.11 PRIMARY OSTEOARTHRITIS OF RIGHT KNEE: ICD-10-CM

## 2023-10-19 DIAGNOSIS — R79.89 PRERENAL AZOTEMIA: ICD-10-CM

## 2023-10-19 PROCEDURE — 99214 PR OFFICE/OUTPT VISIT, EST, LEVL IV, 30-39 MIN: ICD-10-PCS | Mod: S$GLB,,, | Performed by: INTERNAL MEDICINE

## 2023-10-19 PROCEDURE — 93000 ELECTROCARDIOGRAM COMPLETE: CPT | Mod: S$GLB,,, | Performed by: INTERNAL MEDICINE

## 2023-10-19 PROCEDURE — 3074F SYST BP LT 130 MM HG: CPT | Mod: CPTII,S$GLB,, | Performed by: INTERNAL MEDICINE

## 2023-10-19 PROCEDURE — 99999 PR PBB SHADOW E&M-EST. PATIENT-LVL III: ICD-10-PCS | Mod: PBBFAC,,, | Performed by: INTERNAL MEDICINE

## 2023-10-19 PROCEDURE — 1159F MED LIST DOCD IN RCRD: CPT | Mod: CPTII,S$GLB,, | Performed by: INTERNAL MEDICINE

## 2023-10-19 PROCEDURE — 3008F BODY MASS INDEX DOCD: CPT | Mod: CPTII,S$GLB,, | Performed by: INTERNAL MEDICINE

## 2023-10-19 PROCEDURE — 1101F PT FALLS ASSESS-DOCD LE1/YR: CPT | Mod: CPTII,S$GLB,, | Performed by: INTERNAL MEDICINE

## 2023-10-19 PROCEDURE — 99214 OFFICE O/P EST MOD 30 MIN: CPT | Mod: S$GLB,,, | Performed by: INTERNAL MEDICINE

## 2023-10-19 PROCEDURE — 1126F PR PAIN SEVERITY QUANTIFIED, NO PAIN PRESENT: ICD-10-PCS | Mod: CPTII,S$GLB,, | Performed by: INTERNAL MEDICINE

## 2023-10-19 PROCEDURE — 1101F PR PT FALLS ASSESS DOC 0-1 FALLS W/OUT INJ PAST YR: ICD-10-PCS | Mod: CPTII,S$GLB,, | Performed by: INTERNAL MEDICINE

## 2023-10-19 PROCEDURE — 1160F PR REVIEW ALL MEDS BY PRESCRIBER/CLIN PHARMACIST DOCUMENTED: ICD-10-PCS | Mod: CPTII,S$GLB,, | Performed by: INTERNAL MEDICINE

## 2023-10-19 PROCEDURE — 3008F PR BODY MASS INDEX (BMI) DOCUMENTED: ICD-10-PCS | Mod: CPTII,S$GLB,, | Performed by: INTERNAL MEDICINE

## 2023-10-19 PROCEDURE — 1126F AMNT PAIN NOTED NONE PRSNT: CPT | Mod: CPTII,S$GLB,, | Performed by: INTERNAL MEDICINE

## 2023-10-19 PROCEDURE — 1159F PR MEDICATION LIST DOCUMENTED IN MEDICAL RECORD: ICD-10-PCS | Mod: CPTII,S$GLB,, | Performed by: INTERNAL MEDICINE

## 2023-10-19 PROCEDURE — 3079F DIAST BP 80-89 MM HG: CPT | Mod: CPTII,S$GLB,, | Performed by: INTERNAL MEDICINE

## 2023-10-19 PROCEDURE — 93000 EKG 12-LEAD: ICD-10-PCS | Mod: S$GLB,,, | Performed by: INTERNAL MEDICINE

## 2023-10-19 PROCEDURE — 1160F RVW MEDS BY RX/DR IN RCRD: CPT | Mod: CPTII,S$GLB,, | Performed by: INTERNAL MEDICINE

## 2023-10-19 PROCEDURE — 3288F FALL RISK ASSESSMENT DOCD: CPT | Mod: CPTII,S$GLB,, | Performed by: INTERNAL MEDICINE

## 2023-10-19 PROCEDURE — 3079F PR MOST RECENT DIASTOLIC BLOOD PRESSURE 80-89 MM HG: ICD-10-PCS | Mod: CPTII,S$GLB,, | Performed by: INTERNAL MEDICINE

## 2023-10-19 PROCEDURE — 3074F PR MOST RECENT SYSTOLIC BLOOD PRESSURE < 130 MM HG: ICD-10-PCS | Mod: CPTII,S$GLB,, | Performed by: INTERNAL MEDICINE

## 2023-10-19 PROCEDURE — 3288F PR FALLS RISK ASSESSMENT DOCUMENTED: ICD-10-PCS | Mod: CPTII,S$GLB,, | Performed by: INTERNAL MEDICINE

## 2023-10-19 PROCEDURE — 99999 PR PBB SHADOW E&M-EST. PATIENT-LVL III: CPT | Mod: PBBFAC,,, | Performed by: INTERNAL MEDICINE

## 2023-10-19 NOTE — PROGRESS NOTES
Patient reports s/sx of a UTI, asking not to come in d/t concerns for COVID; order placed for macrobid. Patient also requesting refill of valtrex; refill provided to patient's pharmacy.    Closing encounter.   Subjective:    Patient ID:  Lorraine Moreno is a 73 y.o. female     Chief Complaint   Patient presents with    Hyperlipidemia    Hypertension       HPI:  Ms  Lorraine Moreno is a 73 y.o. female is here for follow-up.    Patient has been doing well no specific complaints at the present time.  Her breathing has been good denies any shortness breath or difficulty in breathing denies any chest pain or tightness or heaviness.  Denies any dizziness or lightheadedness or loss of consciousness falls or head injury.    She is taking all her medications regularly.        Review of patient's allergies indicates:  No Known Allergies    Past Medical History:   Diagnosis Date    Back pain     Bruxism, sleep-related     wears appliance    H/O total hip arthroplasty, right     Hypercholesterolemia     Hypertension     Obesity     Primary osteoarthritis of knees, bilateral     Primary osteoarthritis of right knee 11/2018    Urinary incontinence      Past Surgical History:   Procedure Laterality Date    ANKLE SURGERY Right     EYE SURGERY      HIP ARTHROPLASTY      8/18/15 right JACKELYN /J&J Depuy press fit    HIP SURGERY Left 03/2022    JOINT REPLACEMENT Right 12/12/2018    TKA    LASIK Bilateral     TOTAL KNEE ARTHROPLASTY Right 12/12/2018    Procedure: ARTHROPLASTY, KNEE, TOTAL;  Surgeon: Malik Herzog MD;  Location: [facility];  Service: Orthopedics;  Laterality: Right;    TUBAL LIGATION       Social History     Tobacco Use    Smoking status: Never    Smokeless tobacco: Never   Substance Use Topics    Alcohol use: Yes     Alcohol/week: 5.0 standard drinks of alcohol     Types: 5 Glasses of wine per week    Drug use: No     Family History   Problem Relation Age of Onset    No Known Problems Mother     Heart disease Father     Heart attack Father     Early death Father 55        heart attack    No Known Problems Brother     No Known Problems Brother     Psoriasis Neg Hx     Melanoma Neg Hx     Lupus Neg Hx     Eczema Neg Hx         Review of  Systems:   Constitution: Negative for diaphoresis and fever.   HEENT: Negative for nosebleeds.    Cardiovascular: Negative for chest pain       No dyspnea on exertion       No leg swelling        No palpitations  Respiratory: Negative for shortness of breath and wheezing.    Hematologic/Lymphatic: Negative for bleeding problem. Does not bruise/bleed easily.   Skin: Negative for color change and rash.   Musculoskeletal: Negative for falls and myalgias.   Gastrointestinal: Negative for hematemesis and hematochezia.   Genitourinary: Negative for hematuria.   Neurological: Negative for dizziness and light-headedness.   Psychiatric/Behavioral: Negative for altered mental status and memory loss.          Objective:        Vitals:    10/19/23 1013   BP: 112/80   Pulse: 67   Resp: 16       Lab Results   Component Value Date    WBC 4.6 03/09/2023    HGB 12.6 03/09/2023    HCT 38.3 03/09/2023     03/09/2023    CHOL 201 (H) 03/09/2023    TRIG 79 03/09/2023    HDL 86 03/09/2023    ALT 9 03/09/2023    AST 15 03/09/2023     03/09/2023    K 4.7 03/09/2023     03/09/2023    CREATININE 0.88 03/09/2023    BUN 20 03/09/2023    CO2 27 03/09/2023    TSH 2.480 02/08/2023    INR 1.0 02/08/2023    HGBA1C 5.0 04/13/2021        ECHOCARDIOGRAM RESULTS  Results for orders placed during the hospital encounter of 02/08/23    Echo    Interpretation Summary  · The left ventricle is normal in size with mild concentric hypertrophy and normal systolic function.  · The estimated ejection fraction is 55%.  · Normal left ventricular diastolic function.  · Normal right ventricular size with normal right ventricular systolic function.        CURRENT/PREVIOUS VISIT EKG  Results for orders placed or performed in visit on 06/13/23   EKG 12-lead    Collection Time: 06/13/23  8:09 AM    Narrative    Test Reason : Z01.818,    Vent. Rate : 067 BPM     Atrial Rate : 067 BPM     P-R Int : 160 ms          QRS Dur : 074 ms      QT Int : 396 ms        P-R-T Axes : 034 031 053 degrees     QTc Int : 418 ms    Normal sinus rhythm with sinus arrhythmia  Normal ECG  When compared with ECG of 08-FEB-2023 10:34,  No significant change was found  Confirmed by Canelo Huffman MD ([id number]) on 7/15/2023 5:17:08 PM    Referred By:  ELOISA           Confirmed By:Canelo Huffman MD     No valid procedures specified.   Results for orders placed during the hospital encounter of 02/08/23    Nuclear Stress Test    Interpretation Summary    The ECG portion of the study is negative for ischemia.    The patient reported no chest pain during the stress test.    During stress, rare PVCs are noted.    The nuclear portion of this study will be reported separately.      Physical Exam:  CONSTITUTIONAL: No fever, no chills  HEENT: Normocephalic, atraumatic,pupils reactive to light                 NECK:  No JVD no carotid bruit  CVS: S1S2+, RRR, faint systolic murmurs,   LUNGS: Clear  ABDOMEN: Soft, NT, BS+  EXTREMITIES: No cyanosis, edema  : No kevin catheter  NEURO: AAO X 3  PSY: Normal affect      Medication List with Changes/Refills   Current Medications    AMLODIPINE (NORVASC) 5 MG TABLET    TAKE 1 TABLET DAILY    ASPIRIN (ECOTRIN) 81 MG EC TABLET    Take 1 tablet (81 mg total) by mouth once daily.    B COMPLEX VITAMINS TABLET    Take 1 tablet by mouth once daily.    CALCIUM CARBONATE (CALCIUM 300 ORAL)    Take 1,200 mg by mouth once daily. pt is staying with daughter left supplements at home she states she will resume them when she returns    FLAXSEED 1,000 MG CAP    Take 1 capsule by mouth once daily. pt is staying with daughter left supplements at home she states she will resume them when she returns    MULTIVITAMIN (THERAGRAN) PER TABLET    Take 1 tablet by mouth once daily. pt is staying with daughter left supplements at home she states she will resume them when she returns    MV-MN/OM3/DHA/EPA/FISH/LUT/ANDRA (OCUVITE ADULT 50 PLUS ORAL)    Take by mouth.    OXYBUTYNIN (DITROPAN) 5 MG  TAB    Take 5 mg by mouth once daily.     PRAVASTATIN (PRAVACHOL) 20 MG TABLET    TAKE 1 TABLET EVERY EVENING             Assessment:       1. Essential hypertension    2. Mixed hyperlipidemia    3. Primary osteoarthritis of right knee    4. Obesity (BMI 30.0-34.9)    5. Prerenal azotemia    6. Nonspecific abnormal electrocardiogram (ECG) (EKG)         Plan:   1. Essential hypertension  Patient's blood pressure is stable.  Is 112/80 and she is currently on amlodipine 5 mg p.o. daily.  When she is tolerating it well.    2. Mixed hyperlipidemia   She is currently taking Pravachol 20 mg p.o. daily and on her last blood work her total cholesterol was 201 HDL was 86 LDL was 98 and triglycerides were 79 patient to continue the same she follows up with the primary physician who is checking her cholesterol.  3. Obesity   Patient has lost weight and currently weighing 186 lb.  Patient to continue to make that effort to lose more weight.  Continue low-fat low-cholesterol diet preferably Mediterranean diet would certainly help to lose weight.   4.  Pre renal azotemia   Her renal function is stable and is much improved.  5. EKG  Reviewed EKG independently patient is in normal sinus rhythm with a normal intervals and normal EKG no acute ST T-wave changes.    6. Patient is doing well at this present time.  Continue current management and I will see her in the office in 9 months time.                    Problem List Items Addressed This Visit          Cardiac/Vascular    Essential hypertension - Primary    Mixed hyperlipidemia       Endocrine    Obesity (BMI 30.0-34.9)       Orthopedic    Primary osteoarthritis of right knee     Other Visit Diagnoses       Prerenal azotemia        Nonspecific abnormal electrocardiogram (ECG) (EKG)        Relevant Orders    IN OFFICE EKG 12-LEAD (to [place])            No follow-ups on file.

## 2023-11-08 LAB — CRC RECOMMENDATION EXT: NORMAL

## 2023-11-09 RX ORDER — PRAVASTATIN SODIUM 20 MG/1
20 TABLET ORAL NIGHTLY
Qty: 90 TABLET | Refills: 3 | Status: SHIPPED | OUTPATIENT
Start: 2023-11-09

## 2023-11-09 NOTE — TELEPHONE ENCOUNTER
----- Message from Ravindra Galvez sent at 11/9/2023 11:17 AM CST -----  Regarding: refill  Contact: lorraine at [phone]  Type:  RX Refill Request    Who Called:  Lorraine  Refill or New Rx:  refill  RX Name and Strength:   Disp Refills Start End   pravastatin (PRAVACHOL) 20 MG tablet 90 tablet 3 5/31/2023 -   Sig: TAKE 1 TABLET EVERY EVENING   Sent to pharmacy as: pravastatin (PRAVACHOL) 20 MG tablet   E-Prescribing Status: Receipt confirmed by pharmacy (5/31/2023  2:21 PM CDT)       How is the patient currently taking it? (ex. 1XDay):  see above  Is this a 30 day or 90 day RX:  see above  Preferred Pharmacy with phone number:    [id number] - [place], [place] - [address] [facility]  [address]  [place] [address]  Phone: [phone] Fax: [phone]       Local or Mail Order:  local  Ordering Provider:  Nathanael Walter Call Back Number:  [phone]    Additional Information:  pt was told by [facility] that mail order was sent and received by pt in Sept 2023. Will not reship or send med earlier. Pt needs hold over Rx sent to [facility] above.

## 2023-11-10 ENCOUNTER — PATIENT OUTREACH (OUTPATIENT)
Dept: ADMINISTRATIVE | Facility: HOSPITAL | Age: 73
End: 2023-11-10
Payer: MEDICARE

## 2023-12-14 ENCOUNTER — OFFICE VISIT (OUTPATIENT)
Dept: FAMILY MEDICINE | Facility: CLINIC | Age: 73
End: 2023-12-14
Payer: MEDICARE

## 2023-12-14 VITALS
WEIGHT: 186 LBS | HEART RATE: 68 BPM | DIASTOLIC BLOOD PRESSURE: 74 MMHG | BODY MASS INDEX: 29.19 KG/M2 | SYSTOLIC BLOOD PRESSURE: 130 MMHG | HEIGHT: 67 IN

## 2023-12-14 DIAGNOSIS — Z99.89 AMBULATES WITH CANE: ICD-10-CM

## 2023-12-14 DIAGNOSIS — Z79.899 LONG-TERM USE OF HIGH-RISK MEDICATION: ICD-10-CM

## 2023-12-14 DIAGNOSIS — I10 ESSENTIAL HYPERTENSION: Primary | ICD-10-CM

## 2023-12-14 DIAGNOSIS — N32.81 OVERACTIVE BLADDER: ICD-10-CM

## 2023-12-14 DIAGNOSIS — M15.9 PRIMARY OSTEOARTHRITIS INVOLVING MULTIPLE JOINTS: ICD-10-CM

## 2023-12-14 PROCEDURE — 3078F DIAST BP <80 MM HG: CPT | Mod: CPTII,S$GLB,, | Performed by: FAMILY MEDICINE

## 2023-12-14 PROCEDURE — 1101F PR PT FALLS ASSESS DOC 0-1 FALLS W/OUT INJ PAST YR: ICD-10-PCS | Mod: CPTII,S$GLB,, | Performed by: FAMILY MEDICINE

## 2023-12-14 PROCEDURE — 99214 PR OFFICE/OUTPT VISIT, EST, LEVL IV, 30-39 MIN: ICD-10-PCS | Mod: S$GLB,,, | Performed by: FAMILY MEDICINE

## 2023-12-14 PROCEDURE — 1101F PT FALLS ASSESS-DOCD LE1/YR: CPT | Mod: CPTII,S$GLB,, | Performed by: FAMILY MEDICINE

## 2023-12-14 PROCEDURE — 3078F PR MOST RECENT DIASTOLIC BLOOD PRESSURE < 80 MM HG: ICD-10-PCS | Mod: CPTII,S$GLB,, | Performed by: FAMILY MEDICINE

## 2023-12-14 PROCEDURE — 3008F BODY MASS INDEX DOCD: CPT | Mod: CPTII,S$GLB,, | Performed by: FAMILY MEDICINE

## 2023-12-14 PROCEDURE — 1160F PR REVIEW ALL MEDS BY PRESCRIBER/CLIN PHARMACIST DOCUMENTED: ICD-10-PCS | Mod: CPTII,S$GLB,, | Performed by: FAMILY MEDICINE

## 2023-12-14 PROCEDURE — 99214 OFFICE O/P EST MOD 30 MIN: CPT | Mod: S$GLB,,, | Performed by: FAMILY MEDICINE

## 2023-12-14 PROCEDURE — 1159F PR MEDICATION LIST DOCUMENTED IN MEDICAL RECORD: ICD-10-PCS | Mod: CPTII,S$GLB,, | Performed by: FAMILY MEDICINE

## 2023-12-14 PROCEDURE — 1159F MED LIST DOCD IN RCRD: CPT | Mod: CPTII,S$GLB,, | Performed by: FAMILY MEDICINE

## 2023-12-14 PROCEDURE — 3075F SYST BP GE 130 - 139MM HG: CPT | Mod: CPTII,S$GLB,, | Performed by: FAMILY MEDICINE

## 2023-12-14 PROCEDURE — 3288F FALL RISK ASSESSMENT DOCD: CPT | Mod: CPTII,S$GLB,, | Performed by: FAMILY MEDICINE

## 2023-12-14 PROCEDURE — 3075F PR MOST RECENT SYSTOLIC BLOOD PRESS GE 130-139MM HG: ICD-10-PCS | Mod: CPTII,S$GLB,, | Performed by: FAMILY MEDICINE

## 2023-12-14 PROCEDURE — 1160F RVW MEDS BY RX/DR IN RCRD: CPT | Mod: CPTII,S$GLB,, | Performed by: FAMILY MEDICINE

## 2023-12-14 PROCEDURE — 3288F PR FALLS RISK ASSESSMENT DOCUMENTED: ICD-10-PCS | Mod: CPTII,S$GLB,, | Performed by: FAMILY MEDICINE

## 2023-12-14 PROCEDURE — 3008F PR BODY MASS INDEX (BMI) DOCUMENTED: ICD-10-PCS | Mod: CPTII,S$GLB,, | Performed by: FAMILY MEDICINE

## 2023-12-14 NOTE — PROGRESS NOTES
SUBJECTIVE:    Patient ID: Lorraine Moreno is a 73 y.o. female.    Chief Complaint: Follow-up (6 mo f/u//)    Pt here to checkup on acute and chronic conditions. (HTN, HLD, OAB, arthritis.)      No longer in therapy since 10/2023 s/p left hip open abductor tendon repair surgery (6/26/23, [facility])  she is still limping so he wants her to do aquatic therapy Jan 2024.      BP is doing ok today. Denies CP/SOB/HA. ([place]).     No longer taking iron. Continues to donate blood every 8 weeks.    Bladder is doing ok. Takes oxybutinin every other night, because otherwise it makes her too dry.       -----------------------------------------------------  Mammogram 2/2023, nl DEXA (RICK Onofre), Pap  Cscope, Hx polyps ([place])  Ophtho-Dr. Self  Flu 10/2023, no COVID        Patient Outreach on 11/10/2023   Component Date Value Ref Range Status    CRC Recommendation External 11/08/2023 Repeat colonoscopy in 3 years   Final       Past Medical History:   Diagnosis Date    Back pain     Bruxism, sleep-related     wears appliance    H/O total hip arthroplasty, right     Hypercholesterolemia     Hypertension     Obesity     Personal history of colonic polyps 11/08/2023    Colonoscopy Mook Aly MD    Primary osteoarthritis of knees, bilateral     Primary osteoarthritis of right knee 11/2018    Urinary incontinence      Social History     Socioeconomic History    Marital status:    Tobacco Use    Smoking status: Never    Smokeless tobacco: Never   Substance and Sexual Activity    Alcohol use: Yes     Alcohol/week: 5.0 standard drinks of alcohol     Types: 5 Glasses of wine per week    Drug use: No    Sexual activity: Not Currently     Past Surgical History:   Procedure Laterality Date    ANKLE SURGERY Right     EYE SURGERY      HIP ARTHROPLASTY      8/18/15 right JACKELYN /J&J Depuy press fit    HIP SURGERY Left 03/2022    JOINT REPLACEMENT Right 12/12/2018    TKA    LASIK Bilateral     TOTAL KNEE ARTHROPLASTY Right  12/12/2018    Procedure: ARTHROPLASTY, KNEE, TOTAL;  Surgeon: Malik Herzog MD;  Location: [facility];  Service: Orthopedics;  Laterality: Right;    TUBAL LIGATION       Family History   Problem Relation Age of Onset    No Known Problems Mother     Heart disease Father     Heart attack Father     Early death Father 55        heart attack    No Known Problems Brother     No Known Problems Brother     Psoriasis Neg Hx     Melanoma Neg Hx     Lupus Neg Hx     Eczema Neg Hx        Review of patient's allergies indicates:  No Known Allergies    Current Outpatient Medications:     amLODIPine (NORVASC) 5 MG tablet, TAKE 1 TABLET DAILY, Disp: 90 tablet, Rfl: 3    aspirin (ECOTRIN) 81 MG EC tablet, Take 1 tablet (81 mg total) by mouth once daily., Disp: 90 tablet, Rfl: 0    b complex vitamins tablet, Take 1 tablet by mouth once daily., Disp: , Rfl:     CALCIUM CARBONATE (CALCIUM 300 ORAL), Take 1,200 mg by mouth once daily. pt is staying with daughter left supplements at home she states she will resume them when she returns, Disp: , Rfl:     flaxseed 1,000 mg Cap, Take 1 capsule by mouth once daily. pt is staying with daughter left supplements at home she states she will resume them when she returns, Disp: , Rfl:     multivitamin (THERAGRAN) per tablet, Take 1 tablet by mouth once daily. pt is staying with daughter left supplements at home she states she will resume them when she returns, Disp: , Rfl:     mv-mn/om3/dha/epa/fish/lut/kyle (OCUVITE ADULT 50 PLUS ORAL), Take by mouth., Disp: , Rfl:     oxybutynin (DITROPAN) 5 MG Tab, Take 5 mg by mouth once daily. , Disp: , Rfl: 12    pravastatin (PRAVACHOL) 20 MG tablet, Take 1 tablet (20 mg total) by mouth every evening., Disp: 90 tablet, Rfl: 3    Review of Systems   Constitutional:  Negative for appetite change, fatigue, fever and unexpected weight change.   Respiratory:  Negative for cough, chest tightness, shortness of breath and wheezing.    Cardiovascular:  Negative for  "chest pain and leg swelling.   Gastrointestinal:  Negative for abdominal pain, constipation, nausea and vomiting.        -heartburn   Genitourinary:  Negative for difficulty urinating, dysuria, frequency and urgency.   Musculoskeletal:  Negative for arthralgias, back pain, myalgias and neck pain.   Skin:  Negative for rash.   Neurological:  Negative for dizziness, weakness, numbness and headaches.   Hematological:  Does not bruise/bleed easily.   Psychiatric/Behavioral:  Negative for dysphoric mood, sleep disturbance and suicidal ideas. The patient is not nervous/anxious.    All other systems reviewed and are negative.         Objective:      Vitals:    12/14/23 1517   BP: 130/74   Pulse: 68   Weight: 84.4 kg (186 lb)   Height: 5' 7" (1.702 m)     Wt Readings from Last 3 Encounters:   12/14/23 84.4 kg (186 lb)   10/19/23 84.4 kg (186 lb)   06/14/23 82.6 kg (182 lb)         Physical Exam  Vitals reviewed.   Constitutional:       General: She is not in acute distress.     Appearance: She is well-developed.   HENT:      Head: Normocephalic and atraumatic.   Neck:      Thyroid: No thyromegaly.      Vascular: No carotid bruit.   Cardiovascular:      Rate and Rhythm: Normal rate and regular rhythm.      Heart sounds: Normal heart sounds. No murmur heard.     No friction rub.   Pulmonary:      Effort: Pulmonary effort is normal.      Breath sounds: Normal breath sounds. No wheezing or rales.   Abdominal:      General: Bowel sounds are normal. There is no distension.      Palpations: Abdomen is soft.      Tenderness: There is no abdominal tenderness.   Musculoskeletal:      Cervical back: Neck supple.   Lymphadenopathy:      Cervical: No cervical adenopathy.   Skin:     General: Skin is warm and dry.      Findings: No rash.   Neurological:      Mental Status: She is alert and oriented to person, place, and time.   Psychiatric:         Attention and Perception: She is attentive.         Speech: Speech normal.         " Behavior: Behavior normal.         Thought Content: Thought content normal.         Judgment: Judgment normal.           Assessment:       1. Essential hypertension    2. Overactive bladder    3. Primary osteoarthritis involving multiple joints    4. Ambulates with cane    5. Long-term use of high-risk medication             Plan:       Essential hypertension  Comments:  Controlled. Will continue to monitor BP on current medication regimen.  Orders:  -     TSH w/reflex to FT4; Future; Expected date: 12/14/2023  -     Urinalysis, Reflex to Urine Culture Urine, Clean Catch; Future; Expected date: 12/14/2023  -     CBC Auto Differential; Future; Expected date: 12/14/2023  -     Lipid Panel; Future; Expected date: 12/14/2023  -     Comprehensive Metabolic Panel; Future; Expected date: 12/14/2023  -     Microalbumin/Creatinine Ratio, Urine; Future; Expected date: 12/14/2023    Overactive bladder  Comments:  Controlled. Will continue to monitor symptoms on ditropan    Primary osteoarthritis involving multiple joints  Comments:  Stable.    Ambulates with cane  Comments:  Encouraged to use cane in order to help prevent falls, injury    Long-term use of high-risk medication  -     TSH w/reflex to FT4; Future; Expected date: 12/14/2023  -     Urinalysis, Reflex to Urine Culture Urine, Clean Catch; Future; Expected date: 12/14/2023  -     CBC Auto Differential; Future; Expected date: 12/14/2023  -     Lipid Panel; Future; Expected date: 12/14/2023  -     Comprehensive Metabolic Panel; Future; Expected date: 12/14/2023  -     Microalbumin/Creatinine Ratio, Urine; Future; Expected date: 12/14/2023      Labs and/or tests have been ordered for the evaluation/monitoring of acute/chronic conditions, to e done just before next visit.    Follow up in about 6 months (around 6/14/2024) for HTN, OAB, Arthritis, LABS.        12/14/2023 Parth Dalton

## 2024-03-13 DIAGNOSIS — Z12.31 VISIT FOR SCREENING MAMMOGRAM: Primary | ICD-10-CM

## 2024-03-15 ENCOUNTER — HOSPITAL ENCOUNTER (OUTPATIENT)
Dept: RADIOLOGY | Facility: HOSPITAL | Age: 74
Discharge: HOME | End: 2024-03-15
Attending: SPECIALIST
Payer: MEDICARE

## 2024-03-15 DIAGNOSIS — Z12.31 VISIT FOR SCREENING MAMMOGRAM: ICD-10-CM

## 2024-03-15 PROCEDURE — 77067 SCR MAMMO BI INCL CAD: CPT | Mod: 26,,, | Performed by: RADIOLOGY

## 2024-03-15 PROCEDURE — 77067 SCR MAMMO BI INCL CAD: CPT | Mod: TC

## 2024-03-15 PROCEDURE — 77063 BREAST TOMOSYNTHESIS BI: CPT | Mod: 26,,, | Performed by: RADIOLOGY

## 2024-03-19 ENCOUNTER — TELEPHONE (OUTPATIENT)
Dept: RADIOLOGY | Facility: HOSPITAL | Age: 74
End: 2024-03-19

## 2024-03-20 ENCOUNTER — TELEPHONE (OUTPATIENT)
Dept: RADIOLOGY | Facility: HOSPITAL | Age: 74
End: 2024-03-20

## 2024-03-21 ENCOUNTER — HOSPITAL ENCOUNTER (OUTPATIENT)
Dept: RADIOLOGY | Facility: HOSPITAL | Age: 74
Discharge: HOME | End: 2024-03-21
Attending: SPECIALIST
Payer: MEDICARE

## 2024-03-21 DIAGNOSIS — R92.8 ABNORMAL MAMMOGRAM: ICD-10-CM

## 2024-03-21 PROCEDURE — 77061 BREAST TOMOSYNTHESIS UNI: CPT | Mod: 26,RT,, | Performed by: RADIOLOGY

## 2024-03-21 PROCEDURE — 77065 DX MAMMO INCL CAD UNI: CPT | Mod: TC,RT

## 2024-03-21 PROCEDURE — 77061 BREAST TOMOSYNTHESIS UNI: CPT | Mod: TC,RT

## 2024-03-21 PROCEDURE — 77065 DX MAMMO INCL CAD UNI: CPT | Mod: 26,RT,, | Performed by: RADIOLOGY

## 2024-05-09 RX ORDER — AMLODIPINE BESYLATE 5 MG/1
TABLET ORAL
Qty: 90 TABLET | Refills: 0 | Status: SHIPPED | OUTPATIENT
Start: 2024-05-09

## 2024-05-21 ENCOUNTER — TELEPHONE (OUTPATIENT)
Dept: FAMILY MEDICINE | Facility: CLINIC | Age: 74
End: 2024-05-21
Payer: MEDICARE

## 2024-05-21 NOTE — PROGRESS NOTES
SUBJECTIVE:    Patient ID: Lorraine Moreno is a 74 y.o. female.    Chief Complaint: Cough (Persistant cough only, for 2 weeks//no med bottles//tc)    Pt here to checkup on acute and chronic conditions. (HTN, HLD, OAB, arthritis.)    Has been having a dry cough for about 1.5 weeks. Taking otc coricidin with mild relief.   Not wheezing and no drip.     BP is doing ok today. Denies CP/SOB/HA. ([place]).             -----------------------------------------------------  Mammogram 3/2024, nl DEXA (RICK Onofre), Pap  Cscope, Hx polyps ([place])  Ophtho-Dr. Self  Flu 10/2023, no COVID    Cough  The current episode started 1 to 4 weeks ago (1.5 weeks). Pertinent negatives include no chest pain, ear pain, fever, headaches, heartburn, myalgias, postnasal drip, rash, sore throat, shortness of breath or wheezing. Nothing aggravates the symptoms. She has tried OTC cough suppressant for the symptoms.       No visits with results within 6 Month(s) from this visit.   Latest known visit with results is:   Patient Outreach on 11/10/2023   Component Date Value Ref Range Status    CRC Recommendation External 11/08/2023 Repeat colonoscopy in 3 years   Final       Past Medical History:   Diagnosis Date    Back pain     Bruxism, sleep-related     wears appliance    H/O total hip arthroplasty, right     Hypercholesterolemia     Hypertension     Obesity     Personal history of colonic polyps 11/08/2023    Colonoscopy Mook Aly MD    Primary osteoarthritis of knees, bilateral     Primary osteoarthritis of right knee 11/2018    Urinary incontinence      Social History     Socioeconomic History    Marital status:    Tobacco Use    Smoking status: Never    Smokeless tobacco: Never   Substance and Sexual Activity    Alcohol use: Yes     Alcohol/week: 5.0 standard drinks of alcohol     Types: 5 Glasses of wine per week    Drug use: No    Sexual activity: Not Currently     Past Surgical History:   Procedure Laterality Date    ANKLE  SURGERY Right     EYE SURGERY      HIP ARTHROPLASTY      8/18/15 right JACKELYN /J&J Depuy press fit    HIP SURGERY Left 03/2022    JOINT REPLACEMENT Right 12/12/2018    TKA    LASIK Bilateral     TOTAL KNEE ARTHROPLASTY Right 12/12/2018    Procedure: ARTHROPLASTY, KNEE, TOTAL;  Surgeon: Malik Herzog MD;  Location: [facility];  Service: Orthopedics;  Laterality: Right;    TUBAL LIGATION       Family History   Problem Relation Name Age of Onset    No Known Problems Mother      Heart disease Father      Heart attack Father      Early death Father  55        heart attack    No Known Problems Brother      No Known Problems Brother      Psoriasis Neg Hx      Melanoma Neg Hx      Lupus Neg Hx      Eczema Neg Hx         Review of patient's allergies indicates:  No Known Allergies    Current Outpatient Medications:     amLODIPine (NORVASC) 5 MG tablet, TAKE 1 TABLET DAILY, Disp: 90 tablet, Rfl: 0    aspirin (ECOTRIN) 81 MG EC tablet, Take 1 tablet (81 mg total) by mouth once daily., Disp: 90 tablet, Rfl: 0    b complex vitamins tablet, Take 1 tablet by mouth once daily., Disp: , Rfl:     CALCIUM CARBONATE (CALCIUM 300 ORAL), Take 1,200 mg by mouth once daily. pt is staying with daughter left supplements at home she states she will resume them when she returns, Disp: , Rfl:     flaxseed 1,000 mg Cap, Take 1 capsule by mouth once daily. pt is staying with daughter left supplements at home she states she will resume them when she returns, Disp: , Rfl:     multivitamin (THERAGRAN) per tablet, Take 1 tablet by mouth once daily. pt is staying with daughter left supplements at home she states she will resume them when she returns, Disp: , Rfl:     mv-mn/om3/dha/epa/fish/lut/kyle (OCUVITE ADULT 50 PLUS ORAL), Take by mouth., Disp: , Rfl:     oxybutynin (DITROPAN) 5 MG Tab, Take 5 mg by mouth once daily. , Disp: , Rfl: 12    pravastatin (PRAVACHOL) 20 MG tablet, Take 1 tablet (20 mg total) by mouth every evening., Disp: 90 tablet, Rfl:  "3    Review of Systems   Constitutional:  Negative for appetite change, fatigue, fever and unexpected weight change.   HENT:  Negative for ear pain, postnasal drip and sore throat.    Respiratory:  Positive for cough. Negative for chest tightness, shortness of breath and wheezing.    Cardiovascular:  Negative for chest pain and leg swelling.   Gastrointestinal:  Negative for abdominal pain, constipation, heartburn, nausea and vomiting.        -heartburn   Genitourinary:  Negative for difficulty urinating, dysuria, frequency and urgency.   Musculoskeletal:  Negative for arthralgias, back pain, myalgias and neck pain.   Skin:  Negative for rash.   Neurological:  Negative for dizziness, weakness, numbness and headaches.   Hematological:  Does not bruise/bleed easily.   Psychiatric/Behavioral:  Negative for dysphoric mood, sleep disturbance and suicidal ideas. The patient is not nervous/anxious.    All other systems reviewed and are negative.         Objective:      Vitals:    05/22/24 0740   BP: 122/80   Pulse: 64   Weight: 81.6 kg (180 lb)   Height: 5' 7" (1.702 m)       Wt Readings from Last 3 Encounters:   05/22/24 81.6 kg (180 lb)   12/14/23 84.4 kg (186 lb)   10/19/23 84.4 kg (186 lb)         Physical Exam  Vitals reviewed.   Constitutional:       General: She is not in acute distress.     Appearance: She is well-developed.   HENT:      Head: Normocephalic and atraumatic.   Neck:      Thyroid: No thyromegaly.      Vascular: No carotid bruit.   Cardiovascular:      Rate and Rhythm: Normal rate and regular rhythm.      Heart sounds: Normal heart sounds. No murmur heard.     No friction rub.   Pulmonary:      Effort: Pulmonary effort is normal.      Breath sounds: Normal breath sounds. No wheezing or rales.   Abdominal:      General: Bowel sounds are normal. There is no distension.      Palpations: Abdomen is soft.      Tenderness: There is no abdominal tenderness.   Musculoskeletal:      Cervical back: Neck supple. "   Lymphadenopathy:      Cervical: No cervical adenopathy.   Skin:     General: Skin is warm and dry.      Findings: No rash.   Neurological:      Mental Status: She is alert and oriented to person, place, and time.   Psychiatric:         Attention and Perception: She is attentive.         Speech: Speech normal.         Behavior: Behavior normal.         Thought Content: Thought content normal.         Judgment: Judgment normal.           Assessment:       1. Acute cough    2. Seasonal allergic rhinitis due to pollen    3. Essential hypertension               Plan:       Acute cough  Comments:  Allergic vs. Nonallergic vs. GERD.  Will get Xray.  Orders:  -     X-Ray Chest PA And Lateral; Future; Expected date: 05/22/2024    Seasonal allergic rhinitis due to pollen  Comments:  Recommend otc antihistamine for 2-3 weeks    Essential hypertension  Comments:  Controlled. Not on ACE.        Labs and/or tests have been ordered for the evaluation/monitoring of acute/chronic conditions, to e done just before next visit.    Follow up if symptoms worsen or fail to improve, for As scheduled.        5/22/2024 Parth Dalton

## 2024-05-21 NOTE — TELEPHONE ENCOUNTER
----- Message from Jessi Rodriguez sent at 5/21/2024  2:51 PM CDT -----  Vm- 2:35-pt has a deep cough for a week and half and would like to be checked out   [phone]

## 2024-05-22 ENCOUNTER — HOSPITAL ENCOUNTER (OUTPATIENT)
Dept: RADIOLOGY | Facility: HOSPITAL | Age: 74
Discharge: HOME | End: 2024-05-22
Attending: FAMILY MEDICINE
Payer: MEDICARE

## 2024-05-22 ENCOUNTER — OFFICE VISIT (OUTPATIENT)
Dept: FAMILY MEDICINE | Facility: CLINIC | Age: 74
End: 2024-05-22
Payer: MEDICARE

## 2024-05-22 VITALS
BODY MASS INDEX: 28.25 KG/M2 | HEART RATE: 64 BPM | HEIGHT: 67 IN | WEIGHT: 180 LBS | SYSTOLIC BLOOD PRESSURE: 122 MMHG | DIASTOLIC BLOOD PRESSURE: 80 MMHG

## 2024-05-22 DIAGNOSIS — I10 ESSENTIAL HYPERTENSION: ICD-10-CM

## 2024-05-22 DIAGNOSIS — R05.1 ACUTE COUGH: ICD-10-CM

## 2024-05-22 DIAGNOSIS — R05.1 ACUTE COUGH: Primary | ICD-10-CM

## 2024-05-22 DIAGNOSIS — J30.1 SEASONAL ALLERGIC RHINITIS DUE TO POLLEN: ICD-10-CM

## 2024-05-22 PROCEDURE — 1101F PT FALLS ASSESS-DOCD LE1/YR: CPT | Mod: CPTII,S$GLB,, | Performed by: FAMILY MEDICINE

## 2024-05-22 PROCEDURE — 3288F FALL RISK ASSESSMENT DOCD: CPT | Mod: CPTII,S$GLB,, | Performed by: FAMILY MEDICINE

## 2024-05-22 PROCEDURE — 1160F RVW MEDS BY RX/DR IN RCRD: CPT | Mod: CPTII,S$GLB,, | Performed by: FAMILY MEDICINE

## 2024-05-22 PROCEDURE — 1159F MED LIST DOCD IN RCRD: CPT | Mod: CPTII,S$GLB,, | Performed by: FAMILY MEDICINE

## 2024-05-22 PROCEDURE — 3079F DIAST BP 80-89 MM HG: CPT | Mod: CPTII,S$GLB,, | Performed by: FAMILY MEDICINE

## 2024-05-22 PROCEDURE — 99213 OFFICE O/P EST LOW 20 MIN: CPT | Mod: S$GLB,,, | Performed by: FAMILY MEDICINE

## 2024-05-22 PROCEDURE — 3074F SYST BP LT 130 MM HG: CPT | Mod: CPTII,S$GLB,, | Performed by: FAMILY MEDICINE

## 2024-05-22 PROCEDURE — 71046 X-RAY EXAM CHEST 2 VIEWS: CPT | Mod: 26,,, | Performed by: RADIOLOGY

## 2024-05-22 PROCEDURE — 3008F BODY MASS INDEX DOCD: CPT | Mod: CPTII,S$GLB,, | Performed by: FAMILY MEDICINE

## 2024-05-22 PROCEDURE — 71046 X-RAY EXAM CHEST 2 VIEWS: CPT | Mod: TC,PO

## 2024-06-06 ENCOUNTER — TELEPHONE (OUTPATIENT)
Dept: FAMILY MEDICINE | Facility: CLINIC | Age: 74
End: 2024-06-06
Payer: MEDICARE

## 2024-06-20 NOTE — HOSPITAL COURSE
Precautions.  Patient is a 72-year-old female who presented with chest pain.  She says that she had shoulder pain and rolled over during the night and her pain radiated to her chest and prompted her to come to the ED.  She was admitted for further evaluation.  Cardiac enzymes were negative and ACS was ruled out.  The patient underwent stress test for further evaluation and stress test was negative.  There were no signs of inducible ischemia.  She is not had any further chest pain at this time.  She has a cardiologist Dr. Huffman and will follow-up with him within a month.  She will also follow with her primary physician within 1 week.  She was discharged in good condition with plans for close outpatient follow-up.  I reviewed the discharge plan of care and instructions with the patient on the day of discharge.  She was given strict return precautions.   soft

## 2024-07-18 DIAGNOSIS — J06.9 UPPER RESPIRATORY TRACT INFECTION, UNSPECIFIED TYPE: Primary | ICD-10-CM

## 2024-07-18 RX ORDER — AZITHROMYCIN 250 MG/1
TABLET, FILM COATED ORAL
Qty: 6 TABLET | Refills: 0 | Status: SHIPPED | OUTPATIENT
Start: 2024-07-18 | End: 2024-07-23

## 2024-07-18 NOTE — TELEPHONE ENCOUNTER
The patient's prescription has been approved and sent to   [id number] - [place], [place] - [address] [facility]  [address]  [place] [address]  Phone: [phone] Fax: [phone]

## 2024-07-18 NOTE — TELEPHONE ENCOUNTER
Spoke with pt states her sore throat started Saturday. Pt c/o cough with severe sore throat. Sightly productive. Denies fever.   Gargling salt water and taking Claritin.

## 2024-07-18 NOTE — TELEPHONE ENCOUNTER
----- Message from Tamar Parra sent at 7/18/2024  8:32 AM CDT -----  The patient has a bad sore throat,a bad cough and congestion. Can you call in something for her. [facility] on [address] # [phone] [facility]

## 2024-08-02 ENCOUNTER — PATIENT MESSAGE (OUTPATIENT)
Dept: DERMATOLOGY | Facility: CLINIC | Age: 74
End: 2024-08-02
Payer: MEDICARE

## 2024-08-02 ENCOUNTER — TELEPHONE (OUTPATIENT)
Dept: DERMATOLOGY | Facility: CLINIC | Age: 74
End: 2024-08-02
Payer: MEDICARE

## 2024-08-02 NOTE — TELEPHONE ENCOUNTER
----- Message from Froy Wyatt sent at 8/2/2024 12:13 PM CDT -----  Type: Needs Medical Advice  Who Called:  pt  Pharmacy name and phone #:    Best Call Back Number: [phone]  Additional Information: pt is calling the office because she was asked to reschedule apt but there wasn't a date to reschedule it to, please call back to advise, Thanks

## 2024-08-14 DIAGNOSIS — I10 ESSENTIAL HYPERTENSION: Primary | ICD-10-CM

## 2024-08-14 RX ORDER — AMLODIPINE BESYLATE 5 MG/1
5 TABLET ORAL DAILY
Qty: 90 TABLET | Refills: 0 | Status: SHIPPED | OUTPATIENT
Start: 2024-08-14

## 2024-08-16 ENCOUNTER — TELEPHONE (OUTPATIENT)
Dept: CARDIOLOGY | Facility: CLINIC | Age: 74
End: 2024-08-16
Payer: MEDICARE

## 2024-08-16 NOTE — TELEPHONE ENCOUNTER
----- Message from Geeta Carvajal sent at 8/16/2024  1:28 PM CDT -----  Regarding: labs/ pt  advise  Contact: pt  Pt would like to speak w/ you regarding results from [facility]    Please call    Phone [phone]    Thank You

## 2024-08-16 NOTE — TELEPHONE ENCOUNTER
Spoke to Mrs. Peters she was wanting us to order a hepatitis blood work. I asked her to please ask her PCP

## 2024-08-27 ENCOUNTER — TELEPHONE (OUTPATIENT)
Dept: FAMILY MEDICINE | Facility: CLINIC | Age: 74
End: 2024-08-27
Payer: MEDICARE

## 2024-08-27 DIAGNOSIS — Z11.59 NEED FOR HEPATITIS B SCREENING TEST: Primary | ICD-10-CM

## 2024-08-27 DIAGNOSIS — R76.8 POSITIVE HEPATITIS TEST: ICD-10-CM

## 2024-08-27 NOTE — TELEPHONE ENCOUNTER
----- Message from Halima Moreno sent at 8/27/2024  2:52 PM CDT -----  Vm: 231    Pt was giving blood at the blood center and was tested positive for hepatitis. They retested her and it was negative. She would like to know if she should get another test done.    [phone]

## 2024-08-30 LAB
COMMENT: NORMAL
COMMENT: NORMAL
HBV CORE AB SERPL QL IA: NORMAL
HBV SURFACE AG SERPL QL IA: NORMAL
HCV AB SERPL QL IA: NORMAL

## 2024-09-03 ENCOUNTER — PATIENT MESSAGE (OUTPATIENT)
Dept: DERMATOLOGY | Facility: CLINIC | Age: 74
End: 2024-09-03
Payer: MEDICARE

## 2024-09-04 ENCOUNTER — TELEPHONE (OUTPATIENT)
Dept: DERMATOLOGY | Facility: CLINIC | Age: 74
End: 2024-09-04
Payer: MEDICARE

## 2024-09-04 NOTE — TELEPHONE ENCOUNTER
Attempted to contact patient, someone answers the line but does not speak back.     ----- Message from Adrianna Cerda sent at 9/4/2024  9:43 AM CDT -----  Regarding: Return missed call  Type:  Patient Returning Call    Who Called:Lorraine   Who Left Message for Patient:Steph   Does the patient know what this is regarding?:Yes   Would the patient rather a call back or a response via [facility]? Call back   Best Call Back Number:[phone]  Additional Information:

## 2024-09-06 ENCOUNTER — OFFICE VISIT (OUTPATIENT)
Dept: DERMATOLOGY | Facility: CLINIC | Age: 74
End: 2024-09-06
Payer: MEDICARE

## 2024-09-06 VITALS — WEIGHT: 179.88 LBS | HEIGHT: 67 IN | BODY MASS INDEX: 28.23 KG/M2

## 2024-09-06 DIAGNOSIS — L82.0 INFLAMED SEBORRHEIC KERATOSIS: Primary | ICD-10-CM

## 2024-09-06 DIAGNOSIS — D22.9 MULTIPLE BENIGN NEVI: ICD-10-CM

## 2024-09-06 DIAGNOSIS — L81.4 SOLAR LENTIGO: ICD-10-CM

## 2024-09-06 DIAGNOSIS — L57.8 OTHER SKIN CHANGES DUE TO CHRONIC EXPOSURE TO NONIONIZING RADIATION: ICD-10-CM

## 2024-09-06 DIAGNOSIS — L82.1 SEBORRHEIC KERATOSES: ICD-10-CM

## 2024-09-06 DIAGNOSIS — Z12.83 SKIN CANCER SCREENING: ICD-10-CM

## 2024-09-06 NOTE — PROGRESS NOTES
"  Subjective:      Patient ID:  Lorraine Moreno is a 74 y.o. female who presents for   Chief Complaint   Patient presents with    Skin Check     TBSE    Spot     Shoulder, back     LOV: 5/2021 - SK, nevi, skin tag, angioma, lentigo    Skin Check - TBSE    C/o spot on upper shoulder  Wants removed    C/o spot on back  Itches, "she treated it before"     Derm Hx:  Denies phx NMSC/MM  Denies fhx MM    Current Outpatient Medications:   ·  amLODIPine (NORVASC) 5 MG tablet, Take 1 tablet (5 mg total) by mouth once daily., Disp: 90 tablet, Rfl: 0  ·  b complex vitamins tablet, Take 1 tablet by mouth once daily., Disp: , Rfl:   ·  CALCIUM CARBONATE (CALCIUM 300 ORAL), Take 1,200 mg by mouth once daily. pt is staying with daughter left supplements at home she states she will resume them when she returns, Disp: , Rfl:   ·  flaxseed 1,000 mg Cap, Take 1 capsule by mouth once daily. pt is staying with daughter left supplements at home she states she will resume them when she returns, Disp: , Rfl:   ·  multivitamin (THERAGRAN) per tablet, Take 1 tablet by mouth once daily. pt is staying with daughter left supplements at home she states she will resume them when she returns, Disp: , Rfl:   ·  mv-mn/om3/dha/epa/fish/lut/kyle (OCUVITE ADULT 50 PLUS ORAL), Take by mouth., Disp: , Rfl:   ·  oxybutynin (DITROPAN) 5 MG Tab, Take 5 mg by mouth once daily. , Disp: , Rfl: 12  ·  pravastatin (PRAVACHOL) 20 MG tablet, Take 1 tablet (20 mg total) by mouth every evening., Disp: 90 tablet, Rfl: 3  ·  aspirin (ECOTRIN) 81 MG EC tablet, Take 1 tablet (81 mg total) by mouth once daily., Disp: 90 tablet, Rfl: 0          Review of Systems   Constitutional:  Negative for fever, chills and fatigue.   Respiratory:  Negative for cough and shortness of breath.    Skin:  Positive for activity-related sunscreen use.   Hematologic/Lymphatic: Does not bruise/bleed easily.       Objective:   Physical Exam   Constitutional: She appears well-developed and " well-nourished. No distress.   Neurological: She is alert and oriented to person, place, and time. She is not disoriented.   Psychiatric: She has a normal mood and affect.   Skin:   Areas Examined (abnormalities noted in diagram):   Scalp / Hair Palpated and Inspected  Head / Face Inspection Performed  Neck Inspection Performed  Chest / Axilla Inspection Performed  Abdomen Inspection Performed  Genitals / Buttocks / Groin Inspection Performed  Back Inspection Performed  RUE Inspected  LUE Inspection Performed  RLE Inspected  LLE Inspection Performed  Nails and Digits Inspection Performed                         Diagram Legend     Erythematous scaling macule/papule c/w actinic keratosis       Vascular papule c/w angioma      Pigmented verrucoid papule/plaque c/w seborrheic keratosis      Yellow umbilicated papule c/w sebaceous hyperplasia      Irregularly shaped tan macule c/w lentigo     1-2 mm smooth white papules consistent with Milia      Movable subcutaneous cyst with punctum c/w epidermal inclusion cyst      Subcutaneous movable cyst c/w pilar cyst      Firm pink to brown papule c/w dermatofibroma      Pedunculated fleshy papule(s) c/w skin tag(s)      Evenly pigmented macule c/w junctional nevus     Mildly variegated pigmented, slightly irregular-bordered macule c/w mildly atypical nevus      Flesh colored to evenly pigmented papule c/w intradermal nevus       Pink pearly papule/plaque c/w basal cell carcinoma      Erythematous hyperkeratotic cursted plaque c/w SCC      Surgical scar with no sign of skin cancer recurrence      Open and closed comedones      Inflammatory papules and pustules      Verrucoid papule consistent consistent with wart     Erythematous eczematous patches and plaques     Dystrophic onycholytic nail with subungual debris c/w onychomycosis     Umbilicated papule    Erythematous-base heme-crusted tan verrucoid plaque consistent with inflamed seborrheic keratosis     Erythematous Silvery  Scaling Plaque c/w Psoriasis     See annotation      Assessment / Plan:        Inflamed seborrheic keratosis  Cryosurgery procedure note:    Verbal consent from the patient is obtained. Liquid nitrogen cryosurgery is applied to 5 lesions to produce a freeze injury. The patient is aware that blisters may form and is instructed on wound care with gentle cleansing and use of vaseline ointment to keep moist until healed. The patient is supplied a handout on cryosurgery and is instructed to call if lesions do not completely resolve.    Skin cancer screening  Total body skin examination performed today including at least 12 points as noted in physical examination. No lesions suspicious for malignancy noted.    Multiple benign nevi  Careful dermoscopy evaluation of nevi performed with none identified as needing biopsy today  Monitor for new mole or moles that are becoming bigger, darker, irritated, or developing irregular borders.     Seborrheic keratoses  These are benign inherited growths without a malignant potential. Reassurance given to patient. No treatment is necessary.     Solar lentigo  This is a benign hyperpigmented sun induced lesion. Daily sun protection will reduce the number of new lesions. Treatment of these benign lesions are considered cosmetic.    Other skin changes due to chronic exposure to nonionizing radiation  Patient instructed in importance in daily broad spectrum sun protection of at least spf 30. Mineral sunscreen ingredients preferred (Zinc +/- Titanium) and can be found OTC.   Patient encouraged to wear hat for all outdoor exposure.   Also discussed sun avoidance and use of protective clothing.             No follow-ups on file.

## 2024-09-09 ENCOUNTER — OFFICE VISIT (OUTPATIENT)
Dept: CARDIOLOGY | Facility: CLINIC | Age: 74
End: 2024-09-09
Payer: MEDICARE

## 2024-09-09 VITALS
DIASTOLIC BLOOD PRESSURE: 84 MMHG | HEIGHT: 67 IN | RESPIRATION RATE: 16 BRPM | OXYGEN SATURATION: 98 % | BODY MASS INDEX: 28.09 KG/M2 | WEIGHT: 179 LBS | SYSTOLIC BLOOD PRESSURE: 146 MMHG | HEART RATE: 64 BPM

## 2024-09-09 DIAGNOSIS — M17.11 PRIMARY OSTEOARTHRITIS OF RIGHT KNEE: ICD-10-CM

## 2024-09-09 DIAGNOSIS — E66.9 OBESITY (BMI 30.0-34.9): ICD-10-CM

## 2024-09-09 DIAGNOSIS — R94.31 NONSPECIFIC ABNORMAL ELECTROCARDIOGRAM (ECG) (EKG): ICD-10-CM

## 2024-09-09 DIAGNOSIS — E78.2 MIXED HYPERLIPIDEMIA: ICD-10-CM

## 2024-09-09 DIAGNOSIS — I10 ESSENTIAL HYPERTENSION: Primary | ICD-10-CM

## 2024-09-09 PROCEDURE — 3008F BODY MASS INDEX DOCD: CPT | Mod: CPTII,S$GLB,, | Performed by: INTERNAL MEDICINE

## 2024-09-09 PROCEDURE — 3288F FALL RISK ASSESSMENT DOCD: CPT | Mod: CPTII,S$GLB,, | Performed by: INTERNAL MEDICINE

## 2024-09-09 PROCEDURE — 1101F PT FALLS ASSESS-DOCD LE1/YR: CPT | Mod: CPTII,S$GLB,, | Performed by: INTERNAL MEDICINE

## 2024-09-09 PROCEDURE — 3079F DIAST BP 80-89 MM HG: CPT | Mod: CPTII,S$GLB,, | Performed by: INTERNAL MEDICINE

## 2024-09-09 PROCEDURE — 1159F MED LIST DOCD IN RCRD: CPT | Mod: CPTII,S$GLB,, | Performed by: INTERNAL MEDICINE

## 2024-09-09 PROCEDURE — 1160F RVW MEDS BY RX/DR IN RCRD: CPT | Mod: CPTII,S$GLB,, | Performed by: INTERNAL MEDICINE

## 2024-09-09 PROCEDURE — 3077F SYST BP >= 140 MM HG: CPT | Mod: CPTII,S$GLB,, | Performed by: INTERNAL MEDICINE

## 2024-09-09 PROCEDURE — 99999 PR PBB SHADOW E&M-EST. PATIENT-LVL III: CPT | Mod: PBBFAC,,, | Performed by: INTERNAL MEDICINE

## 2024-09-09 PROCEDURE — 1126F AMNT PAIN NOTED NONE PRSNT: CPT | Mod: CPTII,S$GLB,, | Performed by: INTERNAL MEDICINE

## 2024-09-09 PROCEDURE — 99214 OFFICE O/P EST MOD 30 MIN: CPT | Mod: S$GLB,,, | Performed by: INTERNAL MEDICINE

## 2024-09-09 NOTE — PROGRESS NOTES
Subjective:    Patient ID:  Lorraine Moreno is a 74 y.o. female     Chief Complaint   Patient presents with    Hyperlipidemia    Hypertension       HPI:  Ms Lorraine Moreno is a 74 y.o. female for follow-up.    Patient has been doing well except she recently had a ligament reattached and still has a limb.    Her breathing is good denies any shortness a breath or difficulty in breathing denies any chest pain or tightness or heaviness denies any dizziness or lightheadedness or loss of consciousness.    She is taking all her medications regularly.        Review of patient's allergies indicates:  No Known Allergies    Past Medical History:   Diagnosis Date    Back pain     Bruxism, sleep-related     wears appliance    H/O total hip arthroplasty, right     Hypercholesterolemia     Hypertension     Obesity     Personal history of colonic polyps 11/08/2023    Colonoscopy Mook Aly MD    Primary osteoarthritis of knees, bilateral     Primary osteoarthritis of right knee 11/2018    Urinary incontinence      Past Surgical History:   Procedure Laterality Date    ANKLE SURGERY Right     EYE SURGERY      HIP ARTHROPLASTY      8/18/15 right JACKELYN /J&J Depuy press fit    HIP SURGERY Left 03/2022    JOINT REPLACEMENT Right 12/12/2018    TKA    LASIK Bilateral     TOTAL KNEE ARTHROPLASTY Right 12/12/2018    Procedure: ARTHROPLASTY, KNEE, TOTAL;  Surgeon: Malik Herzog MD;  Location: [facility];  Service: Orthopedics;  Laterality: Right;    TUBAL LIGATION       Social History     Tobacco Use    Smoking status: Never    Smokeless tobacco: Never   Substance Use Topics    Alcohol use: Yes     Alcohol/week: 5.0 standard drinks of alcohol     Types: 5 Glasses of wine per week    Drug use: No     Family History   Problem Relation Name Age of Onset    No Known Problems Mother      Heart disease Father      Heart attack Father      Early death Father  55        heart attack    No Known Problems Brother      No Known Problems Brother       Psoriasis Neg Hx      Melanoma Neg Hx      Lupus Neg Hx      Eczema Neg Hx          Review of Systems:   Constitution: Negative for diaphoresis and fever.   HEENT: Negative for nosebleeds.    Cardiovascular: Negative for chest pain       No dyspnea on exertion       No leg swelling        No palpitations  Respiratory: Negative for shortness of breath and wheezing.    Hematologic/Lymphatic: Negative for bleeding problem. Does not bruise/bleed easily.   Skin: Negative for color change and rash.   Musculoskeletal: Negative for falls and myalgias.   Gastrointestinal: Negative for hematemesis and hematochezia.   Genitourinary: Negative for hematuria.   Neurological: Negative for dizziness and light-headedness.   Psychiatric/Behavioral: Negative for altered mental status and memory loss.          Objective:        Vitals:    09/09/24 1503   BP: (!) 146/84   Pulse: 64   Resp: 16       Lab Results   Component Value Date    WBC 4.5 08/29/2024    HGB 12.1 08/29/2024    HCT 38.3 08/29/2024     08/29/2024    CHOL 175 08/29/2024    TRIG 81 08/29/2024    HDL 69 08/29/2024    ALT 10 08/29/2024    AST 15 08/29/2024     08/29/2024    K 4.5 08/29/2024     08/29/2024    CREATININE 0.91 08/29/2024    BUN 18 08/29/2024    CO2 26 08/29/2024    TSH 2.480 02/08/2023    INR 1.0 02/08/2023    HGBA1C 5.0 04/13/2021        ECHOCARDIOGRAM RESULTS  Results for orders placed during the hospital encounter of 02/08/23    Echo    Interpretation Summary  · The left ventricle is normal in size with mild concentric hypertrophy and normal systolic function.  · The estimated ejection fraction is 55%.  · Normal left ventricular diastolic function.  · Normal right ventricular size with normal right ventricular systolic function.        CURRENT/PREVIOUS VISIT EKG  Results for orders placed or performed in visit on 10/19/23   IN OFFICE EKG 12-LEAD (to [place])    Collection Time: 10/19/23 10:08 AM    Narrative    Test Reason :  R94.31,    Vent. Rate : 067 BPM     Atrial Rate : 067 BPM     P-R Int : 162 ms          QRS Dur : 082 ms      QT Int : 394 ms       P-R-T Axes : 043 051 072 degrees     QTc Int : 416 ms    Normal sinus rhythm  Normal ECG  When compared with ECG of 13-JUN-2023 08:09,  No significant change was found  Confirmed by Clinton Huffman MD ([id number]) on 11/16/2023 8:36:06 AM    Referred By:             Confirmed By:Clinton Huffman MD     No valid procedures specified.   Results for orders placed during the hospital encounter of 02/08/23    Nuclear Stress Test    Interpretation Summary    The ECG portion of the study is negative for ischemia.    The patient reported no chest pain during the stress test.    During stress, rare PVCs are noted.    The nuclear portion of this study will be reported separately.      Physical Exam:  CONSTITUTIONAL: No fever, no chills  HEENT: Normocephalic, atraumatic,pupils reactive to light                 NECK:  No JVD no carotid bruit  CVS: S1S2+, RRR, systolic murmurs,   LUNGS: Clear  ABDOMEN: Soft, NT, BS+  EXTREMITIES: No cyanosis, edema  : No kevin catheter  NEURO: AAO X 3  PSY: Normal affect      Medication List with Changes/Refills   Current Medications    AMLODIPINE (NORVASC) 5 MG TABLET    Take 1 tablet (5 mg total) by mouth once daily.    ASPIRIN (ECOTRIN) 81 MG EC TABLET    Take 1 tablet (81 mg total) by mouth once daily.    B COMPLEX VITAMINS TABLET    Take 1 tablet by mouth once daily.    CALCIUM CARBONATE (CALCIUM 300 ORAL)    Take 1,200 mg by mouth once daily. pt is staying with daughter left supplements at home she states she will resume them when she returns    FLAXSEED 1,000 MG CAP    Take 1 capsule by mouth once daily. pt is staying with daughter left supplements at home she states she will resume them when she returns    MULTIVITAMIN (THERAGRAN) PER TABLET    Take 1 tablet by mouth once daily. pt is staying with daughter left supplements at home she states she will resume them  when she returns    MV-MN/OM3/DHA/EPA/FISH/LUT/ANDRA (OCUVITE ADULT 50 PLUS ORAL)    Take by mouth.    OXYBUTYNIN (DITROPAN) 5 MG TAB    Take 5 mg by mouth once daily.     PRAVASTATIN (PRAVACHOL) 20 MG TABLET    Take 1 tablet (20 mg total) by mouth every evening.             Assessment:       1. Essential hypertension    2. Mixed hyperlipidemia    3. Primary osteoarthritis of right knee    4. Obesity (BMI 30.0-34.9)    5. Nonspecific abnormal electrocardiogram (ECG) (EKG)         Plan:   1. Essential hypertension   Patient's blood pressure is stable at 140 6/84 she is currently on amlodipine 5 mg p.o. daily continue the same.  2. Mixed hyperlipidemia   She is currently on pravastatin 20 mg p.o. q.h.s. continue the same on her last blood work her total cholesterol was 175 HDL was 69 LDL is was 89 and triglycerides were 81.    Continue current management she follows up with the primary physician is checking her cholesterol and liver function tests.    3. Osteoarthritis .  Patient is currently wearing her brace for her left leg because of ambulatory dysfunction.  Due to her ligament reattachment.  4. EKG   Reviewed EKG independently patient is in normal sinus rhythm with a heart rate of about 65 beats per minute normal intervals and no acute ST T-wave changes essentially normal EKG.  5. Patient to continue current management and I will see her back in the office in a year's time.                Problem List Items Addressed This Visit          Cardiac/Vascular    Essential hypertension - Primary    Mixed hyperlipidemia       Endocrine    Obesity (BMI 30.0-34.9)       Orthopedic    Primary osteoarthritis of right knee     Other Visit Diagnoses       Nonspecific abnormal electrocardiogram (ECG) (EKG)        Relevant Orders    IN OFFICE EKG 12-LEAD (to [place])            No follow-ups on file.

## 2024-09-10 LAB
OHS QRS DURATION: 76 MS
OHS QTC CALCULATION: 436 MS

## 2024-10-14 RX ORDER — PRAVASTATIN SODIUM 20 MG/1
20 TABLET ORAL NIGHTLY
Qty: 90 TABLET | Refills: 3 | Status: SHIPPED | OUTPATIENT
Start: 2024-10-14

## 2024-11-04 ENCOUNTER — OFFICE VISIT (OUTPATIENT)
Dept: DERMATOLOGY | Facility: CLINIC | Age: 74
End: 2024-11-04
Payer: MEDICARE

## 2024-11-04 VITALS — BODY MASS INDEX: 28.09 KG/M2 | WEIGHT: 179 LBS | HEIGHT: 67 IN

## 2024-11-04 DIAGNOSIS — B07.0 PLANTAR WART: Primary | ICD-10-CM

## 2024-11-04 DIAGNOSIS — I10 ESSENTIAL HYPERTENSION: ICD-10-CM

## 2024-11-04 PROCEDURE — 99499 UNLISTED E&M SERVICE: CPT | Mod: S$GLB,,, | Performed by: DERMATOLOGY

## 2024-11-04 PROCEDURE — 17110 DESTRUCTION B9 LES UP TO 14: CPT | Mod: S$GLB,,, | Performed by: DERMATOLOGY

## 2024-11-04 NOTE — PROGRESS NOTES
Subjective:      Patient ID:  Lorraine Moreno is a 74 y.o. female who presents for   Chief Complaint   Patient presents with    Spot     Left foot     LOV: 9/6/24 - ISK, SK, nevi, lentigo    C/o spot on left foot  About 1 week, painful    Derm Hx:  Denies phx NMSC/MM  Denies fhx MM    Current Outpatient Medications:   ·  amLODIPine (NORVASC) 5 MG tablet, Take 1 tablet (5 mg total) by mouth once daily., Disp: 90 tablet, Rfl: 0  ·  b complex vitamins tablet, Take 1 tablet by mouth once daily., Disp: , Rfl:   ·  CALCIUM CARBONATE (CALCIUM 300 ORAL), Take 1,200 mg by mouth once daily. pt is staying with daughter left supplements at home she states she will resume them when she returns, Disp: , Rfl:   ·  flaxseed 1,000 mg Cap, Take 1 capsule by mouth once daily. pt is staying with daughter left supplements at home she states she will resume them when she returns, Disp: , Rfl:   ·  multivitamin (THERAGRAN) per tablet, Take 1 tablet by mouth once daily. pt is staying with daughter left supplements at home she states she will resume them when she returns, Disp: , Rfl:   ·  mv-mn/om3/dha/epa/fish/lut/kyle (OCUVITE ADULT 50 PLUS ORAL), Take by mouth., Disp: , Rfl:   ·  oxybutynin (DITROPAN) 5 MG Tab, Take 5 mg by mouth once daily. , Disp: , Rfl: 12  ·  pravastatin (PRAVACHOL) 20 MG tablet, TAKE 1 TABLET(20 MG) BY MOUTH EVERY EVENING, Disp: 90 tablet, Rfl: 3  ·  aspirin (ECOTRIN) 81 MG EC tablet, Take 1 tablet (81 mg total) by mouth once daily., Disp: 90 tablet, Rfl: 0        Review of Systems   Constitutional:  Negative for fever, chills and fatigue.   Respiratory:  Negative for cough and shortness of breath.    Skin:  Positive for activity-related sunscreen use.   Hematologic/Lymphatic: Does not bruise/bleed easily.       Objective:   Physical Exam   Constitutional: She appears well-developed and well-nourished. No distress.   Neurological: She is alert and oriented to person, place, and time. She is not disoriented.    Psychiatric: She has a normal mood and affect.   Skin:   Areas Examined (abnormalities noted in diagram):   Nails and Digits Inspection Performed                         Diagram Legend     Erythematous scaling macule/papule c/w actinic keratosis       Vascular papule c/w angioma      Pigmented verrucoid papule/plaque c/w seborrheic keratosis      Yellow umbilicated papule c/w sebaceous hyperplasia      Irregularly shaped tan macule c/w lentigo     1-2 mm smooth white papules consistent with Milia      Movable subcutaneous cyst with punctum c/w epidermal inclusion cyst      Subcutaneous movable cyst c/w pilar cyst      Firm pink to brown papule c/w dermatofibroma      Pedunculated fleshy papule(s) c/w skin tag(s)      Evenly pigmented macule c/w junctional nevus     Mildly variegated pigmented, slightly irregular-bordered macule c/w mildly atypical nevus      Flesh colored to evenly pigmented papule c/w intradermal nevus       Pink pearly papule/plaque c/w basal cell carcinoma      Erythematous hyperkeratotic cursted plaque c/w SCC      Surgical scar with no sign of skin cancer recurrence      Open and closed comedones      Inflammatory papules and pustules      Verrucoid papule consistent consistent with wart     Erythematous eczematous patches and plaques     Dystrophic onycholytic nail with subungual debris c/w onychomycosis     Umbilicated papule    Erythematous-base heme-crusted tan verrucoid plaque consistent with inflamed seborrheic keratosis     Erythematous Silvery Scaling Plaque c/w Psoriasis     See annotation      Assessment / Plan:        Plantar wart, favored    Hyperkeratotic patch with central core, pared down, pinpoint vessels, VV vs corn, cryo today    Reeval in 4 weeks  Cryosurgery procedure note:    Verbal consent from the patient is obtained. Liquid nitrogen cryosurgery is applied to 1 verruca with prior paring, as detailed in the physical exam, to produce a freeze injury. 3 consecutive freeze  thaw cycles are applied to each verruca. The patient is aware that blisters (possibly blood blisters) may form.           No follow-ups on file.

## 2024-11-05 RX ORDER — AMLODIPINE BESYLATE 5 MG/1
5 TABLET ORAL
Qty: 90 TABLET | Refills: 3 | Status: SHIPPED | OUTPATIENT
Start: 2024-11-05

## 2024-12-05 ENCOUNTER — OFFICE VISIT (OUTPATIENT)
Dept: DERMATOLOGY | Facility: CLINIC | Age: 74
End: 2024-12-05
Payer: MEDICARE

## 2024-12-05 VITALS — BODY MASS INDEX: 28.09 KG/M2 | HEIGHT: 67 IN | WEIGHT: 179 LBS

## 2024-12-05 DIAGNOSIS — B07.0 PLANTAR WART: Primary | ICD-10-CM

## 2024-12-05 PROCEDURE — 99499 UNLISTED E&M SERVICE: CPT | Mod: S$GLB,,, | Performed by: DERMATOLOGY

## 2024-12-05 PROCEDURE — 17110 DESTRUCTION B9 LES UP TO 14: CPT | Mod: S$GLB,,, | Performed by: DERMATOLOGY

## 2024-12-05 NOTE — PROGRESS NOTES
Subjective:      Patient ID:  Lorraine Moreno is a 74 y.o. female who presents for   Chief Complaint   Patient presents with    Plantar Warts     Left foot     LOV: 11/4/24 - plantar wart     Patient here for plantar wart follow up  Still present, not as painful     Derm Hx:  Denies phx NMSC/MM  Denies fhx MM    Current Outpatient Medications:   ·  amLODIPine (NORVASC) 5 MG tablet, TAKE 1 TABLET DAILY, Disp: 90 tablet, Rfl: 3  ·  b complex vitamins tablet, Take 1 tablet by mouth once daily., Disp: , Rfl:   ·  CALCIUM CARBONATE (CALCIUM 300 ORAL), Take 1,200 mg by mouth once daily. pt is staying with daughter left supplements at home she states she will resume them when she returns, Disp: , Rfl:   ·  flaxseed 1,000 mg Cap, Take 1 capsule by mouth once daily. pt is staying with daughter left supplements at home she states she will resume them when she returns, Disp: , Rfl:   ·  multivitamin (THERAGRAN) per tablet, Take 1 tablet by mouth once daily. pt is staying with daughter left supplements at home she states she will resume them when she returns, Disp: , Rfl:   ·  mv-mn/om3/dha/epa/fish/lut/kyle (OCUVITE ADULT 50 PLUS ORAL), Take by mouth., Disp: , Rfl:   ·  oxybutynin (DITROPAN) 5 MG Tab, Take 5 mg by mouth once daily. , Disp: , Rfl: 12  ·  pravastatin (PRAVACHOL) 20 MG tablet, TAKE 1 TABLET(20 MG) BY MOUTH EVERY EVENING, Disp: 90 tablet, Rfl: 3  ·  aspirin (ECOTRIN) 81 MG EC tablet, Take 1 tablet (81 mg total) by mouth once daily., Disp: 90 tablet, Rfl: 0             Review of Systems   Constitutional:  Negative for fever, chills and fatigue.   Respiratory:  Negative for cough and shortness of breath.    Skin:  Positive for activity-related sunscreen use.   Hematologic/Lymphatic: Does not bruise/bleed easily.       Objective:   Physical Exam   Skin:              Diagram Legend     Erythematous scaling macule/papule c/w actinic keratosis       Vascular papule c/w angioma      Pigmented verrucoid papule/plaque c/w  seborrheic keratosis      Yellow umbilicated papule c/w sebaceous hyperplasia      Irregularly shaped tan macule c/w lentigo     1-2 mm smooth white papules consistent with Milia      Movable subcutaneous cyst with punctum c/w epidermal inclusion cyst      Subcutaneous movable cyst c/w pilar cyst      Firm pink to brown papule c/w dermatofibroma      Pedunculated fleshy papule(s) c/w skin tag(s)      Evenly pigmented macule c/w junctional nevus     Mildly variegated pigmented, slightly irregular-bordered macule c/w mildly atypical nevus      Flesh colored to evenly pigmented papule c/w intradermal nevus       Pink pearly papule/plaque c/w basal cell carcinoma      Erythematous hyperkeratotic cursted plaque c/w SCC      Surgical scar with no sign of skin cancer recurrence      Open and closed comedones      Inflammatory papules and pustules      Verrucoid papule consistent consistent with wart     Erythematous eczematous patches and plaques     Dystrophic onycholytic nail with subungual debris c/w onychomycosis     Umbilicated papule    Erythematous-base heme-crusted tan verrucoid plaque consistent with inflamed seborrheic keratosis     Erythematous Silvery Scaling Plaque c/w Psoriasis     See annotation      Assessment / Plan:        Plantar wart    Cryosurgery procedure note:    Verbal consent from the patient is obtained. Liquid nitrogen cryosurgery is applied to 1 verruca with prior paring, as detailed in the physical exam, to produce a freeze injury. 3 consecutive freeze thaw cycles are applied to each verruca. The patient is aware that blisters (possibly blood blisters) may form.           No follow-ups on file.

## 2025-01-24 ENCOUNTER — OFFICE VISIT (OUTPATIENT)
Dept: DERMATOLOGY | Facility: CLINIC | Age: 75
End: 2025-01-24
Payer: MEDICARE

## 2025-01-24 VITALS — WEIGHT: 179 LBS | BODY MASS INDEX: 28.09 KG/M2 | HEIGHT: 67 IN

## 2025-01-24 DIAGNOSIS — B35.3 TINEA PEDIS OF LEFT FOOT: Primary | ICD-10-CM

## 2025-01-24 DIAGNOSIS — B07.8 COMMON WART: ICD-10-CM

## 2025-01-24 PROCEDURE — 3008F BODY MASS INDEX DOCD: CPT | Mod: CPTII,S$GLB,, | Performed by: DERMATOLOGY

## 2025-01-24 PROCEDURE — 3288F FALL RISK ASSESSMENT DOCD: CPT | Mod: CPTII,S$GLB,, | Performed by: DERMATOLOGY

## 2025-01-24 PROCEDURE — 1160F RVW MEDS BY RX/DR IN RCRD: CPT | Mod: CPTII,S$GLB,, | Performed by: DERMATOLOGY

## 2025-01-24 PROCEDURE — 1159F MED LIST DOCD IN RCRD: CPT | Mod: CPTII,S$GLB,, | Performed by: DERMATOLOGY

## 2025-01-24 PROCEDURE — 99213 OFFICE O/P EST LOW 20 MIN: CPT | Mod: 25,S$GLB,, | Performed by: DERMATOLOGY

## 2025-01-24 PROCEDURE — 17110 DESTRUCTION B9 LES UP TO 14: CPT | Mod: S$GLB,,, | Performed by: DERMATOLOGY

## 2025-01-24 PROCEDURE — 1101F PT FALLS ASSESS-DOCD LE1/YR: CPT | Mod: CPTII,S$GLB,, | Performed by: DERMATOLOGY

## 2025-01-24 RX ORDER — NAFTIFINE HYDROCHLORIDE 10 MG/G
GEL TOPICAL
Qty: 90 G | Refills: 2 | Status: SHIPPED | OUTPATIENT
Start: 2025-01-24

## 2025-01-24 RX ORDER — ECONAZOLE NITRATE 10 MG/G
CREAM TOPICAL
Qty: 30 G | Refills: 2 | Status: SHIPPED | OUTPATIENT
Start: 2025-01-24

## 2025-01-24 NOTE — PROGRESS NOTES
Subjective:      Patient ID:  Lorraine Moreno is a 74 y.o. female who presents for   Chief Complaint   Patient presents with    Rash     Left foot     LOV: 12/5/24 - plantar wart    C/o rash on the left foot , dorsal aspect  About 2 months, itchy  Tried Cortizone cream, neosporin, athletes foot cream - no real relief    Plantar wart has resolved per patient    Derm Hx:  Denies phx NMSC/MM  Denies fhx MM    Current Outpatient Medications:   ·  amLODIPine (NORVASC) 5 MG tablet, TAKE 1 TABLET DAILY, Disp: 90 tablet, Rfl: 3  ·  b complex vitamins tablet, Take 1 tablet by mouth once daily., Disp: , Rfl:   ·  CALCIUM CARBONATE (CALCIUM 300 ORAL), Take 1,200 mg by mouth once daily. pt is staying with daughter left supplements at home she states she will resume them when she returns, Disp: , Rfl:   ·  flaxseed 1,000 mg Cap, Take 1 capsule by mouth once daily. pt is staying with daughter left supplements at home she states she will resume them when she returns, Disp: , Rfl:   ·  multivitamin (THERAGRAN) per tablet, Take 1 tablet by mouth once daily. pt is staying with daughter left supplements at home she states she will resume them when she returns, Disp: , Rfl:   ·  mv-mn/om3/dha/epa/fish/lut/kyle (OCUVITE ADULT 50 PLUS ORAL), Take by mouth., Disp: , Rfl:   ·  oxybutynin (DITROPAN) 5 MG Tab, Take 5 mg by mouth once daily. , Disp: , Rfl: 12  ·  pravastatin (PRAVACHOL) 20 MG tablet, TAKE 1 TABLET(20 MG) BY MOUTH EVERY EVENING, Disp: 90 tablet, Rfl: 3  ·  aspirin (ECOTRIN) 81 MG EC tablet, Take 1 tablet (81 mg total) by mouth once daily., Disp: 90 tablet, Rfl: 0        Review of Systems   Constitutional:  Negative for fever, chills and fatigue.   Respiratory:  Negative for cough and shortness of breath.    Skin:  Positive for activity-related sunscreen use.   Hematologic/Lymphatic: Does not bruise/bleed easily.       Objective:   Physical Exam   Constitutional: She appears well-developed and well-nourished.   Neurological: She  "is alert.   Psychiatric: She has a normal mood and affect.   Skin:              Diagram Legend     Erythematous scaling macule/papule c/w actinic keratosis       Vascular papule c/w angioma      Pigmented verrucoid papule/plaque c/w seborrheic keratosis      Yellow umbilicated papule c/w sebaceous hyperplasia      Irregularly shaped tan macule c/w lentigo     1-2 mm smooth white papules consistent with Milia      Movable subcutaneous cyst with punctum c/w epidermal inclusion cyst      Subcutaneous movable cyst c/w pilar cyst      Firm pink to brown papule c/w dermatofibroma      Pedunculated fleshy papule(s) c/w skin tag(s)      Evenly pigmented macule c/w junctional nevus     Mildly variegated pigmented, slightly irregular-bordered macule c/w mildly atypical nevus      Flesh colored to evenly pigmented papule c/w intradermal nevus       Pink pearly papule/plaque c/w basal cell carcinoma      Erythematous hyperkeratotic cursted plaque c/w SCC      Surgical scar with no sign of skin cancer recurrence      Open and closed comedones      Inflammatory papules and pustules      Verrucoid papule consistent consistent with wart     Erythematous eczematous patches and plaques     Dystrophic onycholytic nail with subungual debris c/w onychomycosis     Umbilicated papule    Erythematous-base heme-crusted tan verrucoid plaque consistent with inflamed seborrheic keratosis     Erythematous Silvery Scaling Plaque c/w Psoriasis     See annotation      Assessment / Plan:        Tinea pedis of left foot  -     NAFTIN 1 % Gel; Apply to affected areas and at least 1" around affected area BID x 2 weeks.  Dispense: 90 g; Refill: 2  -     econazole nitrate 1 % cream; Apply to soles and interdigital spaces BID until resolution  Dispense: 30 g; Refill: 2  Left dorsal foot  Nails ok  Partially treated with OTC  Avoid steroid cream    If rx not covered, best OTC antifungal is lotrimin ULTRA (butenafine)    Common wart    Cryosurgery " procedure note:    Verbal consent from the patient is obtained. Liquid nitrogen cryosurgery is applied to 1 verruca with prior paring, as detailed in the physical exam, to produce a freeze injury. 2 consecutive freeze thaw cycles are applied to each verruca. The patient is aware that blisters (possibly blood blisters) may form.           No follow-ups on file.

## 2025-02-21 DIAGNOSIS — Z00.00 ENCOUNTER FOR MEDICARE ANNUAL WELLNESS EXAM: ICD-10-CM

## 2025-03-10 ENCOUNTER — PATIENT MESSAGE (OUTPATIENT)
Dept: ADMINISTRATIVE | Facility: HOSPITAL | Age: 75
End: 2025-03-10
Payer: MEDICARE

## 2025-04-08 DIAGNOSIS — Z12.31 ENCOUNTER FOR SCREENING MAMMOGRAM FOR MALIGNANT NEOPLASM OF BREAST: Primary | ICD-10-CM

## 2025-04-14 ENCOUNTER — HOSPITAL ENCOUNTER (OUTPATIENT)
Dept: RADIOLOGY | Facility: HOSPITAL | Age: 75
Discharge: HOME | End: 2025-04-14
Payer: MEDICARE

## 2025-04-14 DIAGNOSIS — Z12.31 ENCOUNTER FOR SCREENING MAMMOGRAM FOR MALIGNANT NEOPLASM OF BREAST: ICD-10-CM

## 2025-04-14 PROCEDURE — 77067 SCR MAMMO BI INCL CAD: CPT | Mod: 26,,, | Performed by: RADIOLOGY

## 2025-04-14 PROCEDURE — 77063 BREAST TOMOSYNTHESIS BI: CPT | Mod: 26,,, | Performed by: RADIOLOGY

## 2025-04-14 PROCEDURE — 77063 BREAST TOMOSYNTHESIS BI: CPT | Mod: TC

## 2025-05-29 ENCOUNTER — OFFICE VISIT (OUTPATIENT)
Dept: CARDIOLOGY | Facility: CLINIC | Age: 75
End: 2025-05-29
Payer: MEDICARE

## 2025-05-29 VITALS
OXYGEN SATURATION: 99 % | WEIGHT: 171.88 LBS | BODY MASS INDEX: 26.98 KG/M2 | SYSTOLIC BLOOD PRESSURE: 136 MMHG | DIASTOLIC BLOOD PRESSURE: 78 MMHG | HEIGHT: 67 IN | HEART RATE: 62 BPM

## 2025-05-29 DIAGNOSIS — I10 ESSENTIAL HYPERTENSION: ICD-10-CM

## 2025-05-29 DIAGNOSIS — E78.2 MIXED HYPERLIPIDEMIA: Primary | ICD-10-CM

## 2025-05-29 PROCEDURE — 1159F MED LIST DOCD IN RCRD: CPT | Mod: CPTII,S$GLB,, | Performed by: INTERNAL MEDICINE

## 2025-05-29 PROCEDURE — 3078F DIAST BP <80 MM HG: CPT | Mod: CPTII,S$GLB,, | Performed by: INTERNAL MEDICINE

## 2025-05-29 PROCEDURE — 1101F PT FALLS ASSESS-DOCD LE1/YR: CPT | Mod: CPTII,S$GLB,, | Performed by: INTERNAL MEDICINE

## 2025-05-29 PROCEDURE — 99999 PR PBB SHADOW E&M-EST. PATIENT-LVL IV: CPT | Mod: PBBFAC,,, | Performed by: INTERNAL MEDICINE

## 2025-05-29 PROCEDURE — 1160F RVW MEDS BY RX/DR IN RCRD: CPT | Mod: CPTII,S$GLB,, | Performed by: INTERNAL MEDICINE

## 2025-05-29 PROCEDURE — 3288F FALL RISK ASSESSMENT DOCD: CPT | Mod: CPTII,S$GLB,, | Performed by: INTERNAL MEDICINE

## 2025-05-29 PROCEDURE — 1126F AMNT PAIN NOTED NONE PRSNT: CPT | Mod: CPTII,S$GLB,, | Performed by: INTERNAL MEDICINE

## 2025-05-29 PROCEDURE — 3008F BODY MASS INDEX DOCD: CPT | Mod: CPTII,S$GLB,, | Performed by: INTERNAL MEDICINE

## 2025-05-29 PROCEDURE — 99214 OFFICE O/P EST MOD 30 MIN: CPT | Mod: S$GLB,,, | Performed by: INTERNAL MEDICINE

## 2025-05-29 PROCEDURE — 3075F SYST BP GE 130 - 139MM HG: CPT | Mod: CPTII,S$GLB,, | Performed by: INTERNAL MEDICINE

## 2025-05-29 NOTE — PROGRESS NOTES
Subjective:    Patient ID:  Lorraine Moreno is a 75 y.o. female patient here for evaluation Follow-up (6 month f/u no issue stated by pt )      History of Present Illness:     75-year-old female here for routine clinic follow up.  New patient to me.  She was following up with Dr. Clinton Huffman in the past.  Denies any exertional anginal symptoms.  Compliant with the medications.  Blood pressure controlled at baseline.  Cardiac testing was unremarkable 3 years ago.        Review of patient's allergies indicates:  No Known Allergies    Past Medical History:   Diagnosis Date    Back pain     Bruxism, sleep-related     wears appliance    H/O total hip arthroplasty, right     Hypercholesterolemia     Hypertension     Obesity     Personal history of colonic polyps 11/08/2023    Colonoscopy Mook Aly MD    Primary osteoarthritis of knees, bilateral     Primary osteoarthritis of right knee 11/2018    Urinary incontinence      Past Surgical History:   Procedure Laterality Date    ANKLE SURGERY Right     EYE SURGERY      HIP ARTHROPLASTY      8/18/15 right JACKELYN /[employer] Depuy press fit    HIP SURGERY Left 03/2022    JOINT REPLACEMENT Right 12/12/2018    TKA    LASIK Bilateral     TOTAL KNEE ARTHROPLASTY Right 12/12/2018    Procedure: ARTHROPLASTY, KNEE, TOTAL;  Surgeon: Malik Herzog MD;  Location: [facility];  Service: Orthopedics;  Laterality: Right;    TUBAL LIGATION       Social History[1]     Review of Systems   Negative except as mentioned in HPI         Objective        Vitals:    05/29/25 1423   BP: 136/78   Pulse: 62       LIPIDS - LAST 2   Lab Results   Component Value Date    CHOL 175 08/29/2024    CHOL 201 (H) 03/09/2023    HDL 69 08/29/2024    HDL 86 03/09/2023    LDLCALC 89 08/29/2024    LDLCALC 98 03/09/2023    TRIG 81 08/29/2024    TRIG 79 03/09/2023    CHOLHDL 2.5 08/29/2024    CHOLHDL 2.3 03/09/2023       CBC - LAST 2  Lab Results   Component Value Date    WBC 4.5 08/29/2024    WBC 4.6 03/09/2023    RBC 3.93  08/29/2024    RBC 4.22 03/09/2023    HGB 12.1 08/29/2024    HGB 12.6 03/09/2023    HCT 38.3 08/29/2024    HCT 38.3 03/09/2023    MCV 97.5 08/29/2024    MCV 90.8 03/09/2023    MCH 30.8 08/29/2024    MCH 29.9 03/09/2023    MCHC 31.6 (L) 08/29/2024    MCHC 32.9 03/09/2023    RDW 13.1 08/29/2024    RDW 12.9 03/09/2023     08/29/2024     03/09/2023    MPV 10.1 08/29/2024    MPV 10.2 03/09/2023    GRAN 3.0 02/08/2023    GRAN 65.0 02/08/2023    LYMPH 972 08/29/2024    LYMPH 21.6 08/29/2024    MONO 324 08/29/2024    MONO 7.2 08/29/2024    BASO 41 08/29/2024    BASO 41 03/09/2023    NRBC 0 02/08/2023    NRBC 0 11/29/2018       CHEMISTRY & LIVER FUNCTION - LAST 2  Lab Results   Component Value Date     08/29/2024     03/09/2023    K 4.5 08/29/2024    K 4.7 03/09/2023     08/29/2024     03/09/2023    CO2 26 08/29/2024    CO2 27 03/09/2023    ANIONGAP 10 02/08/2023    ANIONGAP 10 11/29/2018    BUN 18 08/29/2024    BUN 20 03/09/2023    CREATININE 0.91 08/29/2024    CREATININE 0.88 03/09/2023    GLU 91 08/29/2024     (H) 03/09/2023    CALCIUM 9.1 08/29/2024    CALCIUM 9.6 03/09/2023    MG 1.7 02/08/2023    MG 2.1 04/13/2021    ALBUMIN 3.9 08/29/2024    ALBUMIN 3.9 03/09/2023    PROT 5.9 (L) 08/29/2024    PROT 6.5 03/09/2023    ALKPHOS 68 02/08/2023    ALKPHOS 62 11/29/2018    ALT 10 08/29/2024    ALT 9 03/09/2023    AST 15 08/29/2024    AST 15 03/09/2023    BILITOT 0.6 08/29/2024    BILITOT 0.5 03/09/2023        CARDIAC PROFILE - LAST 2  Lab Results   Component Value Date    BNP 36 02/08/2023     02/08/2023    TROPONINIHS 3.7 02/08/2023    TROPONINIHS 2.9 02/08/2023        COAGULATION - LAST 2  Lab Results   Component Value Date    LABPT 12.6 11/29/2018    LABPT 12.2 08/04/2015    INR 1.0 02/08/2023    INR 1.0 11/29/2018    APTT 29.5 08/04/2015       ENDOCRINE & PSA - LAST 2  Lab Results   Component Value Date    HGBA1C 5.0 04/13/2021    TSH 2.480 02/08/2023    TSH 3.83  02/11/2022        ECHOCARDIOGRAM RESULTS  Results for orders placed during the hospital encounter of 02/08/23    Echo    Interpretation Summary  · The left ventricle is normal in size with mild concentric hypertrophy and normal systolic function.  · The estimated ejection fraction is 55%.  · Normal left ventricular diastolic function.  · Normal right ventricular size with normal right ventricular systolic function.      CURRENT/PREVIOUS VISIT EKG  Results for orders placed or performed in visit on 09/09/24   IN OFFICE EKG 12-LEAD (to [employer])    Collection Time: 09/09/24  2:58 PM   Result Value Ref Range    QRS Duration 76 ms    OHS QTC Calculation 436 ms    Narrative    Test Reason : R94.31,    Vent. Rate : 065 BPM     Atrial Rate : 065 BPM     P-R Int : 164 ms          QRS Dur : 076 ms      QT Int : 420 ms       P-R-T Axes : 034 038 046 degrees     QTc Int : 436 ms    Normal sinus rhythm  Normal ECG  When compared with ECG of 19-OCT-2023 10:08,  No significant change was found  Confirmed by Clinton Huffman MD ([id number]) on 9/30/2024 5:28:25 PM    Referred By:             Confirmed By:Clinton Huffman MD     No valid procedures specified.   Results for orders placed during the hospital encounter of 02/08/23    Nuclear Stress Test    Interpretation Summary    The ECG portion of the study is negative for ischemia.    The patient reported no chest pain during the stress test.    During stress, rare PVCs are noted.    The nuclear portion of this study will be reported separately.    No valid procedures specified.          PREVIOUS STRESS TEST              PREVIOUS ANGIOGRAM        PHYSICAL EXAM    CONSTITUTIONAL: Well built, well nourished in no apparent distress  HEENT: No pallor  NECK: no JVD  LUNGS: CTA b/l  HEART: regular rate and rhythm, S1, S2 normal, no murmur   ABDOMEN:  Nondistended  EXTREMITIES: No edema  NEURO: AAO X 3   SKIN:  No rash  Psych:  Normal affect    I HAVE REVIEWED :    The vital signs, nurses notes, and  "all the pertinent recent radiology studies, cardiovascular studies and labs.  I have independently reviewed the patient's most recent EKG.        Current Outpatient Medications   Medication Instructions    amLODIPine (NORVASC) 5 mg, Oral    aspirin (ECOTRIN) 81 mg, Oral, Daily    b complex vitamins tablet 1 tablet, Daily    CALCIUM CARBONATE (CALCIUM 300 ORAL) 1,200 mg, Daily    econazole nitrate 1 % cream Apply to soles and interdigital spaces BID until resolution    flaxseed 1,000 mg Cap 1 capsule, Daily    mv-mn/om3/dha/epa/fish/lut/kyle (OCUVITE ADULT 50 PLUS ORAL) Take by mouth.    NAFTIN 1 % Gel Apply to affected areas and at least 1" around affected area BID x 2 weeks.    oxybutynin (DITROPAN) 5 mg, Daily    pravastatin (PRAVACHOL) 20 mg, Oral, Nightly        ECG reviewed by me:  Normal sinus rhythm  Assessment and Plan     Hypertension-fairly controlled at baseline.  Continue amlodipine 5 mg daily.  Low-sodium diet.  Advised  Home BP monitoring  Dyslipidemia-continue pravastatin 20 mg q.h.s..  Repeat lipid profile      Follow up in about 8 months (around 1/29/2026).     Mixed hyperlipidemia  -     Lipid Panel; Future; Expected date: 05/29/2025    Essential hypertension  -     IN OFFICE EKG 12-LEAD (to [employer])              [1]   Social History  Tobacco Use    Smoking status: Never    Smokeless tobacco: Never   Substance Use Topics    Alcohol use: Yes     Alcohol/week: 5.0 standard drinks of alcohol     Types: 5 Glasses of wine per week    Drug use: No     "

## 2025-06-19 ENCOUNTER — LAB VISIT (OUTPATIENT)
Dept: LAB | Facility: HOSPITAL | Age: 75
End: 2025-06-19
Attending: INTERNAL MEDICINE
Payer: MEDICARE

## 2025-06-19 DIAGNOSIS — E78.2 MIXED HYPERLIPIDEMIA: ICD-10-CM

## 2025-06-19 LAB
CHOLEST SERPL-MCNC: 183 MG/DL (ref 120–199)
CHOLEST/HDLC SERPL: 2.3 {RATIO} (ref 2–5)
HDLC SERPL-MCNC: 80 MG/DL (ref 40–75)
HDLC SERPL: 43.7 % (ref 20–50)
LDLC SERPL CALC-MCNC: 90.8 MG/DL (ref 63–159)
NONHDLC SERPL-MCNC: 103 MG/DL
TRIGL SERPL-MCNC: 61 MG/DL (ref 30–150)

## 2025-06-19 PROCEDURE — 36415 COLL VENOUS BLD VENIPUNCTURE: CPT

## 2025-06-19 PROCEDURE — 82465 ASSAY BLD/SERUM CHOLESTEROL: CPT
